# Patient Record
Sex: FEMALE | Race: WHITE | HISPANIC OR LATINO | Employment: FULL TIME | URBAN - METROPOLITAN AREA
[De-identification: names, ages, dates, MRNs, and addresses within clinical notes are randomized per-mention and may not be internally consistent; named-entity substitution may affect disease eponyms.]

---

## 2021-11-17 ENCOUNTER — APPOINTMENT (OUTPATIENT)
Dept: RADIOLOGY | Facility: MEDICAL CENTER | Age: 46
End: 2021-11-17
Attending: EMERGENCY MEDICINE
Payer: COMMERCIAL

## 2021-11-17 ENCOUNTER — HOSPITAL ENCOUNTER (OUTPATIENT)
Facility: MEDICAL CENTER | Age: 46
End: 2021-11-18
Attending: EMERGENCY MEDICINE | Admitting: STUDENT IN AN ORGANIZED HEALTH CARE EDUCATION/TRAINING PROGRAM
Payer: COMMERCIAL

## 2021-11-17 ENCOUNTER — OFFICE VISIT (OUTPATIENT)
Dept: URGENT CARE | Facility: CLINIC | Age: 46
End: 2021-11-17
Payer: COMMERCIAL

## 2021-11-17 VITALS
HEIGHT: 64 IN | RESPIRATION RATE: 16 BRPM | BODY MASS INDEX: 25.37 KG/M2 | DIASTOLIC BLOOD PRESSURE: 92 MMHG | OXYGEN SATURATION: 98 % | TEMPERATURE: 98.8 F | WEIGHT: 148.59 LBS | HEART RATE: 83 BPM | SYSTOLIC BLOOD PRESSURE: 126 MMHG

## 2021-11-17 DIAGNOSIS — R42 DIZZY SPELLS: ICD-10-CM

## 2021-11-17 DIAGNOSIS — R94.31 ABNORMAL EKG: ICD-10-CM

## 2021-11-17 DIAGNOSIS — K21.9 GASTROESOPHAGEAL REFLUX DISEASE WITHOUT ESOPHAGITIS: ICD-10-CM

## 2021-11-17 DIAGNOSIS — R42 LIGHT HEADED: ICD-10-CM

## 2021-11-17 DIAGNOSIS — R07.9 CHEST PAIN, UNSPECIFIED TYPE: ICD-10-CM

## 2021-11-17 DIAGNOSIS — R53.83 OTHER FATIGUE: ICD-10-CM

## 2021-11-17 DIAGNOSIS — M54.9 UPPER BACK PAIN: ICD-10-CM

## 2021-11-17 PROBLEM — R79.89 ELEVATED LFTS: Status: ACTIVE | Noted: 2020-12-03

## 2021-11-17 PROBLEM — M48.02 CERVICAL STENOSIS OF SPINE: Status: ACTIVE | Noted: 2021-11-17

## 2021-11-17 LAB
ALBUMIN SERPL BCP-MCNC: 4.6 G/DL (ref 3.2–4.9)
ALBUMIN/GLOB SERPL: 2 G/DL
ALP SERPL-CCNC: 53 U/L (ref 30–99)
ALT SERPL-CCNC: 14 U/L (ref 2–50)
ANION GAP SERPL CALC-SCNC: 10 MMOL/L (ref 7–16)
AST SERPL-CCNC: 21 U/L (ref 12–45)
BASOPHILS # BLD AUTO: 0.7 % (ref 0–1.8)
BASOPHILS # BLD: 0.06 K/UL (ref 0–0.12)
BILIRUB SERPL-MCNC: 0.6 MG/DL (ref 0.1–1.5)
BUN SERPL-MCNC: 10 MG/DL (ref 8–22)
CALCIUM SERPL-MCNC: 9 MG/DL (ref 8.5–10.5)
CHLORIDE SERPL-SCNC: 106 MMOL/L (ref 96–112)
CO2 SERPL-SCNC: 23 MMOL/L (ref 20–33)
CREAT SERPL-MCNC: 0.78 MG/DL (ref 0.5–1.4)
EKG IMPRESSION: NORMAL
EKG IMPRESSION: NORMAL
EOSINOPHIL # BLD AUTO: 0.21 K/UL (ref 0–0.51)
EOSINOPHIL NFR BLD: 2.6 % (ref 0–6.9)
ERYTHROCYTE [DISTWIDTH] IN BLOOD BY AUTOMATED COUNT: 50.6 FL (ref 35.9–50)
GLOBULIN SER CALC-MCNC: 2.3 G/DL (ref 1.9–3.5)
GLUCOSE SERPL-MCNC: 87 MG/DL (ref 65–99)
HCT VFR BLD AUTO: 43.3 % (ref 37–47)
HGB BLD-MCNC: 14.2 G/DL (ref 12–16)
IMM GRANULOCYTES # BLD AUTO: 0.02 K/UL (ref 0–0.11)
IMM GRANULOCYTES NFR BLD AUTO: 0.2 % (ref 0–0.9)
LYMPHOCYTES # BLD AUTO: 1.81 K/UL (ref 1–4.8)
LYMPHOCYTES NFR BLD: 22.5 % (ref 22–41)
MCH RBC QN AUTO: 29 PG (ref 27–33)
MCHC RBC AUTO-ENTMCNC: 32.8 G/DL (ref 33.6–35)
MCV RBC AUTO: 88.4 FL (ref 81.4–97.8)
MONOCYTES # BLD AUTO: 0.72 K/UL (ref 0–0.85)
MONOCYTES NFR BLD AUTO: 8.9 % (ref 0–13.4)
NEUTROPHILS # BLD AUTO: 5.24 K/UL (ref 2–7.15)
NEUTROPHILS NFR BLD: 65.1 % (ref 44–72)
NRBC # BLD AUTO: 0 K/UL
NRBC BLD-RTO: 0 /100 WBC
PLATELET # BLD AUTO: 218 K/UL (ref 164–446)
PMV BLD AUTO: 11.8 FL (ref 9–12.9)
POTASSIUM SERPL-SCNC: 3.9 MMOL/L (ref 3.6–5.5)
PROT SERPL-MCNC: 6.9 G/DL (ref 6–8.2)
RBC # BLD AUTO: 4.9 M/UL (ref 4.2–5.4)
SODIUM SERPL-SCNC: 139 MMOL/L (ref 135–145)
TROPONIN T SERPL-MCNC: <6 NG/L (ref 6–19)
WBC # BLD AUTO: 8.1 K/UL (ref 4.8–10.8)

## 2021-11-17 PROCEDURE — A9270 NON-COVERED ITEM OR SERVICE: HCPCS | Performed by: EMERGENCY MEDICINE

## 2021-11-17 PROCEDURE — 85025 COMPLETE CBC W/AUTO DIFF WBC: CPT

## 2021-11-17 PROCEDURE — 93005 ELECTROCARDIOGRAM TRACING: CPT

## 2021-11-17 PROCEDURE — 93005 ELECTROCARDIOGRAM TRACING: CPT | Performed by: EMERGENCY MEDICINE

## 2021-11-17 PROCEDURE — 71045 X-RAY EXAM CHEST 1 VIEW: CPT

## 2021-11-17 PROCEDURE — 36415 COLL VENOUS BLD VENIPUNCTURE: CPT

## 2021-11-17 PROCEDURE — G0378 HOSPITAL OBSERVATION PER HR: HCPCS

## 2021-11-17 PROCEDURE — 93000 ELECTROCARDIOGRAM COMPLETE: CPT | Performed by: NURSE PRACTITIONER

## 2021-11-17 PROCEDURE — 700111 HCHG RX REV CODE 636 W/ 250 OVERRIDE (IP): Performed by: STUDENT IN AN ORGANIZED HEALTH CARE EDUCATION/TRAINING PROGRAM

## 2021-11-17 PROCEDURE — 93005 ELECTROCARDIOGRAM TRACING: CPT | Performed by: STUDENT IN AN ORGANIZED HEALTH CARE EDUCATION/TRAINING PROGRAM

## 2021-11-17 PROCEDURE — 99285 EMERGENCY DEPT VISIT HI MDM: CPT

## 2021-11-17 PROCEDURE — 99219 PR INITIAL OBSERVATION CARE,LEVL II: CPT | Performed by: STUDENT IN AN ORGANIZED HEALTH CARE EDUCATION/TRAINING PROGRAM

## 2021-11-17 PROCEDURE — 84484 ASSAY OF TROPONIN QUANT: CPT

## 2021-11-17 PROCEDURE — 700102 HCHG RX REV CODE 250 W/ 637 OVERRIDE(OP): Performed by: EMERGENCY MEDICINE

## 2021-11-17 PROCEDURE — 96372 THER/PROPH/DIAG INJ SC/IM: CPT

## 2021-11-17 PROCEDURE — 700102 HCHG RX REV CODE 250 W/ 637 OVERRIDE(OP): Performed by: STUDENT IN AN ORGANIZED HEALTH CARE EDUCATION/TRAINING PROGRAM

## 2021-11-17 PROCEDURE — A9270 NON-COVERED ITEM OR SERVICE: HCPCS | Performed by: STUDENT IN AN ORGANIZED HEALTH CARE EDUCATION/TRAINING PROGRAM

## 2021-11-17 PROCEDURE — 80053 COMPREHEN METABOLIC PANEL: CPT

## 2021-11-17 PROCEDURE — 99204 OFFICE O/P NEW MOD 45 MIN: CPT | Performed by: NURSE PRACTITIONER

## 2021-11-17 RX ORDER — ENALAPRILAT 1.25 MG/ML
1.25 INJECTION INTRAVENOUS EVERY 6 HOURS PRN
Status: DISCONTINUED | OUTPATIENT
Start: 2021-11-17 | End: 2021-11-18 | Stop reason: HOSPADM

## 2021-11-17 RX ORDER — BISACODYL 10 MG
10 SUPPOSITORY, RECTAL RECTAL
Status: DISCONTINUED | OUTPATIENT
Start: 2021-11-17 | End: 2021-11-18 | Stop reason: HOSPADM

## 2021-11-17 RX ORDER — ONDANSETRON 4 MG/1
4 TABLET, ORALLY DISINTEGRATING ORAL EVERY 4 HOURS PRN
Status: DISCONTINUED | OUTPATIENT
Start: 2021-11-17 | End: 2021-11-18 | Stop reason: HOSPADM

## 2021-11-17 RX ORDER — CLONIDINE HYDROCHLORIDE 0.1 MG/1
0.1 TABLET ORAL EVERY 6 HOURS PRN
Status: DISCONTINUED | OUTPATIENT
Start: 2021-11-17 | End: 2021-11-18 | Stop reason: HOSPADM

## 2021-11-17 RX ORDER — ONDANSETRON 2 MG/ML
4 INJECTION INTRAMUSCULAR; INTRAVENOUS EVERY 4 HOURS PRN
Status: DISCONTINUED | OUTPATIENT
Start: 2021-11-17 | End: 2021-11-18 | Stop reason: HOSPADM

## 2021-11-17 RX ORDER — ASPIRIN 300 MG/1
300 SUPPOSITORY RECTAL DAILY
Status: CANCELLED | OUTPATIENT
Start: 2021-11-17

## 2021-11-17 RX ORDER — LABETALOL HYDROCHLORIDE 5 MG/ML
10 INJECTION, SOLUTION INTRAVENOUS EVERY 4 HOURS PRN
Status: DISCONTINUED | OUTPATIENT
Start: 2021-11-17 | End: 2021-11-18 | Stop reason: HOSPADM

## 2021-11-17 RX ORDER — OMEPRAZOLE 20 MG/1
20 CAPSULE, DELAYED RELEASE ORAL 2 TIMES DAILY
Status: DISCONTINUED | OUTPATIENT
Start: 2021-11-17 | End: 2021-11-18 | Stop reason: HOSPADM

## 2021-11-17 RX ORDER — POLYETHYLENE GLYCOL 3350 17 G/17G
1 POWDER, FOR SOLUTION ORAL
Status: DISCONTINUED | OUTPATIENT
Start: 2021-11-17 | End: 2021-11-18 | Stop reason: HOSPADM

## 2021-11-17 RX ORDER — ASPIRIN 81 MG/1
324 TABLET, CHEWABLE ORAL DAILY
Status: CANCELLED | OUTPATIENT
Start: 2021-11-17

## 2021-11-17 RX ORDER — ACETAMINOPHEN 325 MG/1
650 TABLET ORAL EVERY 6 HOURS PRN
Status: DISCONTINUED | OUTPATIENT
Start: 2021-11-17 | End: 2021-11-18 | Stop reason: HOSPADM

## 2021-11-17 RX ORDER — ASPIRIN 325 MG
325 TABLET ORAL DAILY
Status: CANCELLED | OUTPATIENT
Start: 2021-11-17

## 2021-11-17 RX ADMIN — ASPIRIN 325 MG: 325 TABLET, COATED ORAL at 13:48

## 2021-11-17 RX ADMIN — ENOXAPARIN SODIUM 40 MG: 40 INJECTION SUBCUTANEOUS at 17:26

## 2021-11-17 RX ADMIN — OMEPRAZOLE 20 MG: 20 CAPSULE, DELAYED RELEASE ORAL at 17:26

## 2021-11-17 ASSESSMENT — ENCOUNTER SYMPTOMS
SPUTUM PRODUCTION: 0
SHORTNESS OF BREATH: 0
NAUSEA: 0
CONSTITUTIONAL NEGATIVE: 1
ABDOMINAL PAIN: 1
EYES NEGATIVE: 1
MUSCULOSKELETAL NEGATIVE: 1
VOMITING: 0
RESPIRATORY NEGATIVE: 1
HEARTBURN: 1
WEAKNESS: 0
CHILLS: 0
NEUROLOGICAL NEGATIVE: 1
ABDOMINAL PAIN: 1
PSYCHIATRIC NEGATIVE: 1
COUGH: 1
HEARTBURN: 1
FEVER: 0

## 2021-11-17 ASSESSMENT — HEART SCORE
HISTORY: MODERATELY SUSPICIOUS
TROPONIN: LESS THAN OR EQUAL TO NORMAL LIMIT
AGE: 45-64
HEART SCORE: 4
ECG: SIGNIFICANT ST-DEPRESSION
RISK FACTORS: NO KNOWN RISK FACTORS

## 2021-11-17 ASSESSMENT — PAIN DESCRIPTION - PAIN TYPE: TYPE: ACUTE PAIN

## 2021-11-17 NOTE — PROGRESS NOTES
"Subjective:   Avani Young is a 46 y.o. female who presents for Gastrophageal Reflux (x4days )       Gastrophageal Reflux  She complains of abdominal pain, chest pain and heartburn.     Pt presents for evaluation of a new problem, reports 4-day history of epigastric pain.  Patient present history of having symptoms such as these due to episodes of not eating.  Patient also reports right upper back pain, intermittent lightheadedness and dizziness, fatigue, and overall not feeling well.  She reports feeling significantly worse today than she has in the past few days.  She called a  telehealth nurse line, who recommended she be seen to rule out possible MI.  Patient does not have a history of cardiac disease nor family history of early cardiac disease.  There is cardiac disease on her paternal side, however patient thinks that this occurs with advanced age.  Patient has tried changing her diet as well as over-the-counter Tums with mild relief.  Patient states that she has had increased stress lately as well as travels long miles frequently for work purposes.  She denies lower extremity swelling, and states that she overall is an active person.    Review of Systems   Constitutional: Negative.    HENT: Negative.    Eyes: Negative.    Respiratory: Negative.    Cardiovascular: Positive for chest pain.   Gastrointestinal: Positive for abdominal pain and heartburn.   Genitourinary: Negative.    Musculoskeletal: Negative.    Skin: Negative.    Neurological: Negative.    Psychiatric/Behavioral: Negative.    All other systems reviewed and are negative.      MEDS: No current outpatient medications on file.  ALLERGIES:   Allergies   Allergen Reactions   • Compazine    • Penicillins        Patient's PMH, SocHx, SurgHx, FamHx, Drug allergies and medications were reviewed.     Objective:   /92   Pulse 83   Temp 37.1 °C (98.8 °F) (Temporal)   Resp 16   Ht 1.626 m (5' 4\")   Wt 67.4 kg (148 lb 9.4 oz)   SpO2 98%   " BMI 25.51 kg/m²     Physical Exam  Vitals and nursing note reviewed.   Constitutional:       General: She is awake.      Appearance: Normal appearance. She is well-developed and normal weight.   HENT:      Head: Normocephalic and atraumatic.      Right Ear: Tympanic membrane, ear canal and external ear normal.      Left Ear: Tympanic membrane, ear canal and external ear normal.      Nose: Nose normal.      Mouth/Throat:      Lips: Pink.      Mouth: Mucous membranes are moist.      Pharynx: Oropharynx is clear. Uvula midline.   Eyes:      Extraocular Movements: Extraocular movements intact.      Conjunctiva/sclera: Conjunctivae normal.      Pupils: Pupils are equal, round, and reactive to light.   Neck:      Thyroid: No thyromegaly.      Trachea: Trachea normal.   Cardiovascular:      Rate and Rhythm: Normal rate and regular rhythm.      Pulses: Normal pulses.      Heart sounds: Normal heart sounds, S1 normal and S2 normal.   Pulmonary:      Effort: Pulmonary effort is normal. No respiratory distress.      Breath sounds: Normal breath sounds. No wheezing, rhonchi or rales.   Abdominal:      General: Bowel sounds are normal.      Palpations: Abdomen is soft.   Musculoskeletal:         General: Normal range of motion.      Cervical back: Full passive range of motion without pain, normal range of motion and neck supple.   Lymphadenopathy:      Cervical: No cervical adenopathy.   Skin:     General: Skin is warm and dry.      Capillary Refill: Capillary refill takes less than 2 seconds.   Neurological:      General: No focal deficit present.      Mental Status: She is alert and oriented to person, place, and time.      Gait: Gait is intact.   Psychiatric:         Attention and Perception: Attention and perception normal.         Mood and Affect: Mood normal.         Speech: Speech normal.         Behavior: Behavior normal. Behavior is cooperative.         Thought Content: Thought content normal.         Judgment: Judgment  normal.       EKG- NSR w/o acute changes    Assessment/Plan:   Assessment      1. Chest pain, unspecified type  - EKG - Clinic Performed    2. Upper back pain  - EKG - Clinic Performed    3. Gastroesophageal reflux disease without esophagitis  - EKG - Clinic Performed    4. Light headed  - EKG - Clinic Performed    5. Dizzy spells  - EKG - Clinic Performed    6. Other fatigue  - EKG - Clinic Performed    Vital signs stable at today's acute urgent care visit.  Reviewed test results that were completed in the clinic.  Discussed possible differentials with patient at length to include ulcer, myocardial infarction, PE, viral illness.  Discussed with patient that she has several different components that may indicate serious pathology of which a more thorough evaluation is warranted.  Due to this, patient recommend to go to ED for further evaluation and higher level of care.  All questions were encouraged and answered to the patient's satisfaction and understanding, and they agree to the plan of care.     I personally reviewed prior external notes and test results pertinent to today's visit.  I have independently reviewed and interpreted all diagnostics ordered during this urgent care acute visit. Time spent evaluating this patient was a minimum of 30 minutes and includes preparing for visit, counseling/education, exam, evaluation, obtaining history, and ordering lab/test/procedures.      Please note that this dictation was created using voice recognition software. I have made a reasonable attempt to correct obvious errors, but I expect that there are errors of grammar and possibly content that I did not discover before finalizing the note.

## 2021-11-17 NOTE — ED PROVIDER NOTES
"ED Provider Note    CHIEF COMPLAINT  Chief Complaint   Patient presents with   • Sent from Urgent Care     Seen there for fatigue, indigestion, and dizziness, but had pain in her R upper back today, pt also had chest pain while waiting at urgent care   • Chest Pain     Today while at urgent care, described as tightness, intermittent, substernal radiating to epigastric, denies cardiac hx. Pt states some relief with eating.        HPI  Avani Young is a 46 y.o. female who presents with intermittent chest discomfort and epigastric discomfort since last Thursday.  She states it feels like \"indigestion\".  She recounts an episode of anterior chest pressure 2 hours after eating.  She states her father had heart disease although is unsure at what age.  She does not smoke.  Patient complaining of fatigue, feeling lightheaded, no diaphoresis or nausea, no cough.  He felt short of breath this morning which was new for her.  No pleuritic component to the pain.  She denies history of DVT or pulmonary embolism.    REVIEW OF SYSTEMS    Constitutional: Fatigue  Respiratory: Short of breath today  Cardiac: Chest pressure  Gastrointestinal: No abdominal pain.  States \"it might be indigestion\"  Musculoskeletal: No back pain  Neurologic: No headache  Skin: No swelling       All other systems are negative.       PAST MEDICAL HISTORY  History reviewed. No pertinent past medical history.    FAMILY HISTORY  No family history on file.    SOCIAL HISTORY  Social History     Socioeconomic History   • Marital status:      Spouse name: Not on file   • Number of children: Not on file   • Years of education: Not on file   • Highest education level: Not on file   Occupational History   • Not on file   Tobacco Use   • Smoking status: Never Smoker   • Smokeless tobacco: Never Used   Substance and Sexual Activity   • Alcohol use: Not Currently   • Drug use: Not Currently   • Sexual activity: Not on file   Other Topics Concern   • Not on " "file   Social History Narrative   • Not on file     Social Determinants of Health     Financial Resource Strain:    • Difficulty of Paying Living Expenses: Not on file   Food Insecurity:    • Worried About Running Out of Food in the Last Year: Not on file   • Ran Out of Food in the Last Year: Not on file   Transportation Needs:    • Lack of Transportation (Medical): Not on file   • Lack of Transportation (Non-Medical): Not on file   Physical Activity:    • Days of Exercise per Week: Not on file   • Minutes of Exercise per Session: Not on file   Stress:    • Feeling of Stress : Not on file   Social Connections:    • Frequency of Communication with Friends and Family: Not on file   • Frequency of Social Gatherings with Friends and Family: Not on file   • Attends Worship Services: Not on file   • Active Member of Clubs or Organizations: Not on file   • Attends Club or Organization Meetings: Not on file   • Marital Status: Not on file   Intimate Partner Violence:    • Fear of Current or Ex-Partner: Not on file   • Emotionally Abused: Not on file   • Physically Abused: Not on file   • Sexually Abused: Not on file   Housing Stability:    • Unable to Pay for Housing in the Last Year: Not on file   • Number of Places Lived in the Last Year: Not on file   • Unstable Housing in the Last Year: Not on file       SURGICAL HISTORY  History reviewed. No pertinent surgical history.    CURRENT MEDICATIONS  Home Medications     Reviewed by Faizan Walton R.N. (Registered Nurse) on 11/17/21 at 1114  Med List Status: <None>   Medication Last Dose Status        Patient Kyle Taking any Medications                       ALLERGIES  Allergies   Allergen Reactions   • Compazine    • Penicillins        PHYSICAL EXAM  VITAL SIGNS: /92   Pulse 81   Temp 36.4 °C (97.6 °F) (Temporal)   Resp 16   Ht 1.626 m (5' 4\")   Wt 67.3 kg (148 lb 5.9 oz)   SpO2 97%   BMI 25.47 kg/m²   Constitutional:  Non-toxic appearance.   HENT: No facial " swelling  Eyes: Anicteric, no conjunctivitis.     Cardiovascular: Normal heart rate, Normal rhythm, no murmur  Pulmonary:  No wheezing, No rales.  Good bilateral air movement  Gastrointestinal: Soft, No tenderness, No masses  Skin: Warm, Dry, No cyanosis.  No peripheral edema  Neurologic: Speech is clear, follows commands, facial expression is symmetrical.  Psychiatric: Affect normal,  Mood normal.  Patient is calm and cooperative  Musculoskeletal: No chest wall tenderness    EKG/Labs  Results for orders placed or performed during the hospital encounter of 11/17/21   CBC with Differential   Result Value Ref Range    WBC 8.1 4.8 - 10.8 K/uL    RBC 4.90 4.20 - 5.40 M/uL    Hemoglobin 14.2 12.0 - 16.0 g/dL    Hematocrit 43.3 37.0 - 47.0 %    MCV 88.4 81.4 - 97.8 fL    MCH 29.0 27.0 - 33.0 pg    MCHC 32.8 (L) 33.6 - 35.0 g/dL    RDW 50.6 (H) 35.9 - 50.0 fL    Platelet Count 218 164 - 446 K/uL    MPV 11.8 9.0 - 12.9 fL    Neutrophils-Polys 65.10 44.00 - 72.00 %    Lymphocytes 22.50 22.00 - 41.00 %    Monocytes 8.90 0.00 - 13.40 %    Eosinophils 2.60 0.00 - 6.90 %    Basophils 0.70 0.00 - 1.80 %    Immature Granulocytes 0.20 0.00 - 0.90 %    Nucleated RBC 0.00 /100 WBC    Neutrophils (Absolute) 5.24 2.00 - 7.15 K/uL    Lymphs (Absolute) 1.81 1.00 - 4.80 K/uL    Monos (Absolute) 0.72 0.00 - 0.85 K/uL    Eos (Absolute) 0.21 0.00 - 0.51 K/uL    Baso (Absolute) 0.06 0.00 - 0.12 K/uL    Immature Granulocytes (abs) 0.02 0.00 - 0.11 K/uL    NRBC (Absolute) 0.00 K/uL   Comp Metabolic Panel   Result Value Ref Range    Sodium 139 135 - 145 mmol/L    Potassium 3.9 3.6 - 5.5 mmol/L    Chloride 106 96 - 112 mmol/L    Co2 23 20 - 33 mmol/L    Anion Gap 10.0 7.0 - 16.0    Glucose 87 65 - 99 mg/dL    Bun 10 8 - 22 mg/dL    Creatinine 0.78 0.50 - 1.40 mg/dL    Calcium 9.0 8.5 - 10.5 mg/dL    AST(SGOT) 21 12 - 45 U/L    ALT(SGPT) 14 2 - 50 U/L    Alkaline Phosphatase 53 30 - 99 U/L    Total Bilirubin 0.6 0.1 - 1.5 mg/dL    Albumin 4.6 3.2  - 4.9 g/dL    Total Protein 6.9 6.0 - 8.2 g/dL    Globulin 2.3 1.9 - 3.5 g/dL    A-G Ratio 2.0 g/dL   TROPONIN   Result Value Ref Range    Troponin T <6 6 - 19 ng/L   ESTIMATED GFR   Result Value Ref Range    GFR If African American >60 >60 mL/min/1.73 m 2    GFR If Non African American >60 >60 mL/min/1.73 m 2   EKG   Result Value Ref Range    Report       [facility] Emergency Dept.    Test Date:  2021  Pt Name:    PHOEBE GAYTAN             Department: ER  MRN:        [id number]                      Room:  Gender:     Female                       Technician: [id number]  :        1975                   Requested By:ER TRIAGE PROTOCOL  Order #:    [id number]                    Reading MD: DEVORAH STALLINGS MD    Measurements  Intervals                                Axis  Rate:       77                           P:          45  AK:         108                          QRS:        81  QRSD:       112                          T:          6  QT:         362  QTc:        410    Interpretive Statements  Sinus rhythm  Short AK interval  Borderline intraventricular conduction delay  Minimal ST depression, inferior leads, V5, V6  No previous ECG available for comparison  Electronically Signed On 2021 11:23:20 PST by DEVORAH STALLINGS MD           RADIOLOGY/PROCEDURES  DX-CHEST-PORTABLE (1 VIEW)   Final Result      No acute cardiac or pulmonary abnormality is noted.            COURSE & MEDICAL DECISION MAKING  Pertinent Labs & Imaging studies reviewed. (See chart for details)  Patient is negative for PE by PERC rule.  Differential diagnosis includes cardiac ischemia, esophagitis or reflux, chest wall pain, pulmonary pathology.  Patient has negative troponin.  Patient has abnormal EKG, we are unable to see her EKG from earlier today at urgent care, there is no old EKG prior to this according to the patient.  I discussed the case with Dr. Strong on-call for cardiology, who felt it reasonable  to hospitalize the patient given EKG abnormalities and new episodes of chest pain.  Plan for hospitalization, ongoing evaluation and treatment under the care of the hospitalist service.  Patient received 325 mg of aspirin in the emergency department.  She is currently chest pain-free    FINAL IMPRESSION     1. Chest pain, unspecified type     2. Abnormal EKG                     Electronically signed by: Harry Kline M.D., 11/17/2021 11:23 AM

## 2021-11-17 NOTE — ED NOTES
Pharmacy Medication Reconciliation      ~Medication reconciliation updated and complete per pt at bedside  ~Allergies have been verified and updated   ~No oral ABX within the last 30 days  ~Patient home pharmacy:[facility]

## 2021-11-17 NOTE — ASSESSMENT & PLAN NOTE
Presenting with chest pain from urgent care. EKG with minimal ST depressions in V3-V5. ED consulted cardio who recommended work up for chest pain  Denies shortness of breath, leg swelling, palpitations. Takes progesterone at home.  Telemetry  Trend trops  ASA given in ED  Omeprazole for acid reflux

## 2021-11-17 NOTE — ED TRIAGE NOTES
"Chief Complaint   Patient presents with   • Sent from Urgent Care     Seen there for fatigue, indigestion, and dizziness, but had pain in her R upper back today, pt also had chest pain while waiting at urgent care   • Chest Pain     Today while at urgent care, described as tightness, intermittent, substernal radiating to epigastric, denies cardiac hx. Pt states some relief with eating.      Pt ambulatory to triage for above complaints, VSS on RA, GCS 15, NAD.    EKG completed at triage and pt roomed.    Denies all s/sx of covid, denies recent travel, denies fevers.    /92   Pulse 81   Temp 36.4 °C (97.6 °F) (Temporal)   Resp 16   Ht 1.626 m (5' 4\")   Wt 67.3 kg (148 lb 5.9 oz)   SpO2 97%   BMI 25.47 kg/m²      "

## 2021-11-17 NOTE — H&P
Hospital Medicine History & Physical Note    Date of Service  11/17/2021    Primary Care Physician  Pcp Pt States None    Consultants  cardiology    Specialist Names: Ligia    Code Status  Full Code    Chief Complaint  Chief Complaint   Patient presents with   • Sent from Urgent Care     Seen there for fatigue, indigestion, and dizziness, but had pain in her R upper back today, pt also had chest pain while waiting at urgent care   • Chest Pain     Today while at urgent care, described as tightness, intermittent, substernal radiating to epigastric, denies cardiac hx. Pt states some relief with eating.        History of Presenting Illness  Avani Young is a 46 y.o. female who presented 11/17/2021 with a history of acid reflux who present with chest pain. Pain started last Thursday and is intermittent, located in the mid upper chest. She had an additional episode this morning lasting 2 hours after eating. She has never had symptoms like this in the past. She has a history of acid reflux which usually presents differently and responds to tums, which it did not this time. She recently was traveling, but denies palpitations, or leg swelling. She reports that she does 20 mins of cardio daily and never has chest pain or shortness of breath.    I discussed the plan of care with patient.    Review of Systems  Review of Systems   Constitutional: Negative for chills and fever.   Respiratory: Positive for cough. Negative for sputum production and shortness of breath.    Cardiovascular: Positive for chest pain. Negative for leg swelling.   Gastrointestinal: Positive for abdominal pain (midepigastric) and heartburn. Negative for nausea and vomiting.   Neurological: Negative for weakness.   All other systems reviewed and are negative.      Past Medical History  Acid reflux c-spine stenosis    Surgical History   tubal ligation     Family History  Father had heart disease with heart surgery(?CABG) in his 60s, mother had lung  cancer and COPD  Family history reviewed with patient. There is family history that is pertinent to the chief complaint.     Social History   reports that she has never smoked. She has never used smokeless tobacco. She reports previous alcohol use. She reports previous drug use.    Allergies  Allergies   Allergen Reactions   • Prochlorperazine Unspecified     Jaw locked up     • Penicillins Unspecified     Childhood         Medications  Prior to Admission Medications   Prescriptions Last Dose Informant Patient Reported? Taking?   Misc Natural Products (PROGESTERONE EX) 11/17/2021 at 0715 Patient Yes Yes   Sig: Apply 1 Application topically 2 times a day.   NON SPECIFIED 11/16/2021 at AM Patient Yes Yes   Sig: Take 2 Capsules by mouth 1 time a day as needed (upset stomach). Tummy Tune Up   Probiotic Product (PROBIOTIC PO) 11/15/2021 at AM Patient Yes Yes   Sig: Take 1 Capsule by mouth 1 time a day as needed (Upset Stomach).   Simethicone (PHAZYME PO) 11/15/2021 at AM Patient Yes Yes   Sig: Take 2 Tablets by mouth 1 time a day as needed (Gas).      Facility-Administered Medications: None       Physical Exam  Temp:  [36.4 °C (97.6 °F)-37.1 °C (98.8 °F)] 36.4 °C (97.6 °F)  Pulse:  [78-89] 89  Resp:  [15-16] 15  BP: (126-165)/(77-97) 140/77  SpO2:  [97 %-98 %] 98 %  Blood Pressure: 140/77   Temperature: 36.4 °C (97.6 °F)   Pulse: 89   Respiration: 15   Pulse Oximetry: 98 %       Physical Exam  Vitals and nursing note reviewed.   Constitutional:       General: She is not in acute distress.     Appearance: Normal appearance. She is not ill-appearing.   HENT:      Head: Normocephalic and atraumatic.   Eyes:      General: No scleral icterus.        Right eye: No discharge.         Left eye: No discharge.   Cardiovascular:      Rate and Rhythm: Normal rate and regular rhythm.      Heart sounds: Normal heart sounds. No murmur heard.      Pulmonary:      Effort: Pulmonary effort is normal. No respiratory distress.      Breath  sounds: Normal breath sounds. No wheezing.   Chest:      Chest wall: Tenderness (sternal) present.   Abdominal:      General: Abdomen is flat. There is no distension.      Palpations: Abdomen is soft.      Tenderness: There is no abdominal tenderness.   Musculoskeletal:         General: No tenderness.      Right lower leg: No edema.      Left lower leg: No edema.   Skin:     General: Skin is warm and dry.      Coloration: Skin is not jaundiced.   Neurological:      Mental Status: She is alert and oriented to person, place, and time.      Cranial Nerves: No cranial nerve deficit.   Psychiatric:         Mood and Affect: Mood normal.         Behavior: Behavior normal.         Judgment: Judgment normal.         Laboratory:  Recent Labs     11/17/21  1130   WBC 8.1   RBC 4.90   HEMOGLOBIN 14.2   HEMATOCRIT 43.3   MCV 88.4   MCH 29.0   MCHC 32.8*   RDW 50.6*   PLATELETCT 218   MPV 11.8     Recent Labs     11/17/21  1235   SODIUM 139   POTASSIUM 3.9   CHLORIDE 106   CO2 23   GLUCOSE 87   BUN 10   CREATININE 0.78   CALCIUM 9.0     Recent Labs     11/17/21  1235   ALTSGPT 14   ASTSGOT 21   ALKPHOSPHAT 53   TBILIRUBIN 0.6   GLUCOSE 87         No results for input(s): NTPROBNP in the last 72 hours.      Recent Labs     11/17/21  1235   TROPONINT <6       Imaging:  DX-CHEST-PORTABLE (1 VIEW)   Final Result      No acute cardiac or pulmonary abnormality is noted.          EKG:  I have personally reviewed the images and compared with prior images. and My impression is: sinus rhythm with minimal ST depression, rate 77    Assessment/Plan:  I anticipate this patient is appropriate for observation status at this time.    * Chest pain  Assessment & Plan  Presenting with chest pain from urgent care. EKG with minimal ST depressions in V3-V5. ED consulted cardio who recommended work up for chest pain  Denies shortness of breath, leg swelling, palpitations. Takes progesterone at home.  Telemetry  Trend trops  ASA given in ED  Omeprazole  for acid reflux    Cervical stenosis of spine  Assessment & Plan  Chronic. Prn pain meds    Acid reflux  Assessment & Plan  omperazole BID      VTE prophylaxis: enoxaparin ppx

## 2021-11-18 VITALS
HEIGHT: 64 IN | TEMPERATURE: 97.7 F | OXYGEN SATURATION: 97 % | DIASTOLIC BLOOD PRESSURE: 68 MMHG | BODY MASS INDEX: 25.33 KG/M2 | SYSTOLIC BLOOD PRESSURE: 113 MMHG | HEART RATE: 80 BPM | WEIGHT: 148.37 LBS | RESPIRATION RATE: 16 BRPM

## 2021-11-18 PROBLEM — R07.9 CHEST PAIN: Status: RESOLVED | Noted: 2021-11-17 | Resolved: 2021-11-18

## 2021-11-18 LAB
ANION GAP SERPL CALC-SCNC: 11 MMOL/L (ref 7–16)
BASOPHILS # BLD AUTO: 1 % (ref 0–1.8)
BASOPHILS # BLD: 0.07 K/UL (ref 0–0.12)
BUN SERPL-MCNC: 15 MG/DL (ref 8–22)
CALCIUM SERPL-MCNC: 9.1 MG/DL (ref 8.5–10.5)
CHLORIDE SERPL-SCNC: 106 MMOL/L (ref 96–112)
CHOLEST SERPL-MCNC: 163 MG/DL (ref 100–199)
CO2 SERPL-SCNC: 22 MMOL/L (ref 20–33)
CREAT SERPL-MCNC: 0.93 MG/DL (ref 0.5–1.4)
EKG IMPRESSION: NORMAL
EOSINOPHIL # BLD AUTO: 0.36 K/UL (ref 0–0.51)
EOSINOPHIL NFR BLD: 5 % (ref 0–6.9)
ERYTHROCYTE [DISTWIDTH] IN BLOOD BY AUTOMATED COUNT: 50.4 FL (ref 35.9–50)
EST. AVERAGE GLUCOSE BLD GHB EST-MCNC: 100 MG/DL
GLUCOSE SERPL-MCNC: 97 MG/DL (ref 65–99)
HBA1C MFR BLD: 5.1 % (ref 4–5.6)
HCT VFR BLD AUTO: 42.1 % (ref 37–47)
HDLC SERPL-MCNC: 62 MG/DL
HGB BLD-MCNC: 13.9 G/DL (ref 12–16)
IMM GRANULOCYTES # BLD AUTO: 0.03 K/UL (ref 0–0.11)
IMM GRANULOCYTES NFR BLD AUTO: 0.4 % (ref 0–0.9)
LDLC SERPL CALC-MCNC: 75 MG/DL
LYMPHOCYTES # BLD AUTO: 2.49 K/UL (ref 1–4.8)
LYMPHOCYTES NFR BLD: 34.3 % (ref 22–41)
MCH RBC QN AUTO: 28.9 PG (ref 27–33)
MCHC RBC AUTO-ENTMCNC: 33 G/DL (ref 33.6–35)
MCV RBC AUTO: 87.5 FL (ref 81.4–97.8)
MONOCYTES # BLD AUTO: 0.7 K/UL (ref 0–0.85)
MONOCYTES NFR BLD AUTO: 9.6 % (ref 0–13.4)
NEUTROPHILS # BLD AUTO: 3.61 K/UL (ref 2–7.15)
NEUTROPHILS NFR BLD: 49.7 % (ref 44–72)
NRBC # BLD AUTO: 0 K/UL
NRBC BLD-RTO: 0 /100 WBC
PLATELET # BLD AUTO: 227 K/UL (ref 164–446)
PMV BLD AUTO: 11.4 FL (ref 9–12.9)
POTASSIUM SERPL-SCNC: 4.4 MMOL/L (ref 3.6–5.5)
RBC # BLD AUTO: 4.81 M/UL (ref 4.2–5.4)
SODIUM SERPL-SCNC: 139 MMOL/L (ref 135–145)
TRIGL SERPL-MCNC: 130 MG/DL (ref 0–149)
TROPONIN T SERPL-MCNC: 11 NG/L (ref 6–19)
TROPONIN T SERPL-MCNC: <6 NG/L (ref 6–19)
TSH SERPL DL<=0.005 MIU/L-ACNC: 2.3 UIU/ML (ref 0.38–5.33)
WBC # BLD AUTO: 7.3 K/UL (ref 4.8–10.8)

## 2021-11-18 PROCEDURE — A9270 NON-COVERED ITEM OR SERVICE: HCPCS | Performed by: EMERGENCY MEDICINE

## 2021-11-18 PROCEDURE — 700111 HCHG RX REV CODE 636 W/ 250 OVERRIDE (IP): Performed by: STUDENT IN AN ORGANIZED HEALTH CARE EDUCATION/TRAINING PROGRAM

## 2021-11-18 PROCEDURE — 84484 ASSAY OF TROPONIN QUANT: CPT | Mod: 91

## 2021-11-18 PROCEDURE — 85025 COMPLETE CBC W/AUTO DIFF WBC: CPT

## 2021-11-18 PROCEDURE — 83036 HEMOGLOBIN GLYCOSYLATED A1C: CPT

## 2021-11-18 PROCEDURE — 84443 ASSAY THYROID STIM HORMONE: CPT

## 2021-11-18 PROCEDURE — 80061 LIPID PANEL: CPT

## 2021-11-18 PROCEDURE — 99217 PR OBSERVATION CARE DISCHARGE: CPT | Performed by: HOSPITALIST

## 2021-11-18 PROCEDURE — 93010 ELECTROCARDIOGRAM REPORT: CPT | Performed by: INTERNAL MEDICINE

## 2021-11-18 PROCEDURE — 700102 HCHG RX REV CODE 250 W/ 637 OVERRIDE(OP): Performed by: EMERGENCY MEDICINE

## 2021-11-18 PROCEDURE — 96372 THER/PROPH/DIAG INJ SC/IM: CPT

## 2021-11-18 PROCEDURE — 80048 BASIC METABOLIC PNL TOTAL CA: CPT

## 2021-11-18 PROCEDURE — 700102 HCHG RX REV CODE 250 W/ 637 OVERRIDE(OP): Performed by: STUDENT IN AN ORGANIZED HEALTH CARE EDUCATION/TRAINING PROGRAM

## 2021-11-18 PROCEDURE — G0378 HOSPITAL OBSERVATION PER HR: HCPCS

## 2021-11-18 PROCEDURE — A9270 NON-COVERED ITEM OR SERVICE: HCPCS | Performed by: STUDENT IN AN ORGANIZED HEALTH CARE EDUCATION/TRAINING PROGRAM

## 2021-11-18 RX ADMIN — ENOXAPARIN SODIUM 40 MG: 40 INJECTION SUBCUTANEOUS at 05:44

## 2021-11-18 RX ADMIN — ASPIRIN 325 MG: 325 TABLET, COATED ORAL at 05:44

## 2021-11-18 RX ADMIN — OMEPRAZOLE 20 MG: 20 CAPSULE, DELAYED RELEASE ORAL at 05:44

## 2021-11-18 NOTE — CARE PLAN
The patient is Stable - Low risk of patient condition declining or worsening    Shift Goals  Clinical Goals: Monitor chest pain, tele monitoring  Patient Goals: Adequate rest   Family Goals: n/a    Progress made toward(s) clinical / shift goals:  progressing      Problem: Pain - Standard  Goal: Alleviation of pain or a reduction in pain to the patient’s comfort goal  Outcome: Progressing  Note: Patient does not complain of chest pain      Problem: Knowledge Deficit - Standard  Goal: Patient and family/care givers will demonstrate understanding of plan of care, disease process/condition, diagnostic tests and medications  Outcome: Progressing  Note: Discussed POC with patient.  Educated patient about no caffeine including decaf.

## 2021-11-18 NOTE — DISCHARGE SUMMARY
Discharge Summary    CHIEF COMPLAINT ON ADMISSION  Chief Complaint   Patient presents with   • Sent from Urgent Care     Seen there for fatigue, indigestion, and dizziness, but had pain in her R upper back today, pt also had chest pain while waiting at urgent care   • Chest Pain     Today while at urgent care, described as tightness, intermittent, substernal radiating to epigastric, denies cardiac hx. Pt states some relief with eating.        Reason for Admission  CP     Admission Date  11/17/2021    CODE STATUS  Full Code    HPI & HOSPITAL COURSE  This is a 46 y.o. female here with history of acid reflux and cervical stenosis who presented with chest pain. Pain started last Thursday and is intermittent, located in the neck, back, and mid upper chest. She also felt fatigued and was instructed by a nurse hotline to present to the ED. She has a history of acid reflux which usually responds to tums, which it did not this time. She recently was traveling, but denies palpitations, or leg swelling. 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 Patient did recently increased her progesterone dose. Troponin negative times two. No EKG changes. All pain has resolved and patient has remained hemodynamically stable. Stress test not completed due to active lifestyle with no symptoms. Patient treated for acid reflux with omeprazole and encouraged to try Maalox.       Therefore, she is discharged in good and stable condition to home with close outpatient follow-up.    The patient recovered much more quickly than anticipated on admission.    Discharge Date  11/18/2021    FOLLOW UP ITEMS POST DISCHARGE  Follow up with PCP    DISCHARGE DIAGNOSES  Principal Problem (Resolved):    Chest pain POA: Unknown  Active Problems:    Acid reflux POA: Unknown    Cervical stenosis of spine POA: Unknown      FOLLOW UP  No future  appointments.  No follow-up provider specified.    MEDICATIONS ON DISCHARGE     Medication List      CONTINUE taking these medications      Instructions   NON SPECIFIED   Take 2 Capsules by mouth 1 time a day as needed (upset stomach). Tummy Tune Up  Dose: 2 Capsule     PHAZYME PO   Take 2 Tablets by mouth 1 time a day as needed (Gas).  Dose: 2 Tablet     PROBIOTIC PO   Take 1 Capsule by mouth 1 time a day as needed (Upset Stomach).  Dose: 1 Capsule     PROGESTERONE EX   Apply 1 Application topically 2 times a day.  Dose: 1 Application            Allergies  Allergies   Allergen Reactions   • Prochlorperazine Unspecified     Jaw locked up     • Penicillins Unspecified     Childhood         DIET  Orders Placed This Encounter   Procedures   • Diet Order Diet: Cardiac; Miscellaneous modifications: (optional): No Decaf, No Caffeine(for test)     Standing Status:   Standing     Number of Occurrences:   1     Order Specific Question:   Diet:     Answer:   Cardiac [6]     Order Specific Question:   Miscellaneous modifications: (optional)     Answer:   No Decaf, No Caffeine(for test) [11]       ACTIVITY  As tolerated.  Weight bearing as tolerated    CONSULTATIONS  None    PROCEDURES  None    LABORATORY  Lab Results   Component Value Date    SODIUM 139 11/18/2021    POTASSIUM 4.4 11/18/2021    CHLORIDE 106 11/18/2021    CO2 22 11/18/2021    GLUCOSE 97 11/18/2021    BUN 15 11/18/2021    CREATININE 0.93 11/18/2021        Lab Results   Component Value Date    WBC 7.3 11/18/2021    HEMOGLOBIN 13.9 11/18/2021    HEMATOCRIT 42.1 11/18/2021    PLATELETCT 227 11/18/2021        Total time of the discharge process exceeds 32 minutes.

## 2021-11-18 NOTE — PROGRESS NOTES
Assumed pt care at 0700. Received report from Kalyani PARISI. A&O x4. Pt reports no chest pain, SOB, nausea, or palpitations.   Updated on POC, communication board updated. Bed locked and in lowest position. Call light and belongings within reach. Non-skid socks in place. Needs met, will continue to monitor.

## 2021-11-18 NOTE — DISCHARGE INSTRUCTIONS
Discharge Instructions per OBI Saul.  Please follow up with your primary care provider    DIET: healthy    ACTIVITY: as tolerated    DIAGNOSIS: Acid reflux, chest pain rule out    Return to ER if you experience chest pain, shortness of breath, unilateral weakness, uncontrolled pain, or uncontrolled bleeding.       Discharge Instructions    Discharged to home by car with self. Discharged via wheelchair, hospital escort: Yes.  Special equipment needed: Not Applicable    Be sure to schedule a follow-up appointment with your primary care doctor or any specialists as instructed.     Discharge Plan:   Diet Plan: Discussed  Activity Level: Discussed  Confirmed Follow up Appointment: Patient to Call and Schedule Appointment  Confirmed Symptoms Management: Discussed  Medication Reconciliation Updated: Yes  Influenza Vaccine Indication: Patient Refuses    I understand that a diet low in cholesterol, fat, and sodium is recommended for good health. Unless I have been given specific instructions below for another diet, I accept this instruction as my diet prescription.   Other diet: Heart healthy    Special Instructions: None    · Is patient discharged on Warfarin / Coumadin?   No     Depression / Suicide Risk    As you are discharged from this [facility] facility, it is important to learn how to keep safe from harming yourself.    Recognize the warning signs:  · Abrupt changes in personality, positive or negative- including increase in energy   · Giving away possessions  · Change in eating patterns- significant weight changes-  positive or negative  · Change in sleeping patterns- unable to sleep or sleeping all the time   · Unwillingness or inability to communicate  · Depression  · Unusual sadness, discouragement and loneliness  · Talk of wanting to die  · Neglect of personal appearance   · Rebelliousness- reckless behavior  · Withdrawal from people/activities they love  · Confusion- inability to  concentrate     If you or a loved one observes any of these behaviors or has concerns about self-harm, here's what you can do:  · Talk about it- your feelings and reasons for harming yourself  · Remove any means that you might use to hurt yourself (examples: pills, rope, extension cords, firearm)  · Get professional help from the community (Mental Health, Substance Abuse, psychological counseling)  · Do not be alone:Call your Safe Contact- someone whom you trust who will be there for you.  · Call your local CRISIS HOTLINE 361-3417 or 626-990-1045  · Call your local Children's Mobile Crisis Response Team [place] (197) 629-5976 or www.[employer]  · Call the toll free National Suicide Prevention Hotlines   · National Suicide Prevention Lifeline 436-705-PUTP (0381)  · National Hope Line Network 800-SUICIDE (835-5097)

## 2021-11-18 NOTE — PROGRESS NOTES
Bedside report received from day shift RN. Assumed care at 1900. Pt is A&Ox4. Pt is sitting up in bed. Patient is on RA. Pt was updated on plan of care. Pt has call light, personal belongings, and bedside table within reach. Bed locked and in lowest position.

## 2022-06-30 ENCOUNTER — OFFICE VISIT (OUTPATIENT)
Dept: MEDICAL GROUP | Facility: PHYSICIAN GROUP | Age: 47
End: 2022-06-30
Payer: COMMERCIAL

## 2022-06-30 VITALS
BODY MASS INDEX: 25.54 KG/M2 | DIASTOLIC BLOOD PRESSURE: 78 MMHG | OXYGEN SATURATION: 100 % | SYSTOLIC BLOOD PRESSURE: 108 MMHG | WEIGHT: 149.6 LBS | HEIGHT: 64 IN | HEART RATE: 94 BPM | TEMPERATURE: 98.6 F

## 2022-06-30 DIAGNOSIS — B00.9 HSV-2 (HERPES SIMPLEX VIRUS 2) INFECTION: ICD-10-CM

## 2022-06-30 DIAGNOSIS — Z00.00 ENCOUNTER FOR MEDICAL EXAMINATION TO ESTABLISH CARE: ICD-10-CM

## 2022-06-30 DIAGNOSIS — Z12.4 ENCOUNTER FOR PAPANICOLAOU SMEAR FOR CERVICAL CANCER SCREENING: ICD-10-CM

## 2022-06-30 DIAGNOSIS — K21.9 GASTROESOPHAGEAL REFLUX DISEASE WITHOUT ESOPHAGITIS: ICD-10-CM

## 2022-06-30 DIAGNOSIS — N92.6 IRREGULAR MENSTRUAL CYCLE: ICD-10-CM

## 2022-06-30 DIAGNOSIS — M48.02 CERVICAL STENOSIS OF SPINE: ICD-10-CM

## 2022-06-30 DIAGNOSIS — Z23 NEED FOR VACCINATION: ICD-10-CM

## 2022-06-30 DIAGNOSIS — Z12.31 ENCOUNTER FOR SCREENING MAMMOGRAM FOR BREAST CANCER: ICD-10-CM

## 2022-06-30 PROBLEM — R79.89 ELEVATED LFTS: Status: RESOLVED | Noted: 2020-12-03 | Resolved: 2022-06-30

## 2022-06-30 PROCEDURE — 90471 IMMUNIZATION ADMIN: CPT

## 2022-06-30 PROCEDURE — 90715 TDAP VACCINE 7 YRS/> IM: CPT

## 2022-06-30 PROCEDURE — 99214 OFFICE O/P EST MOD 30 MIN: CPT | Mod: 25

## 2022-06-30 RX ORDER — ACYCLOVIR 400 MG/1
400 TABLET ORAL 3 TIMES DAILY
Qty: 45 TABLET | Refills: 3 | Status: SHIPPED | OUTPATIENT
Start: 2022-06-30 | End: 2022-07-05

## 2022-06-30 ASSESSMENT — PATIENT HEALTH QUESTIONNAIRE - PHQ9: CLINICAL INTERPRETATION OF PHQ2 SCORE: 0

## 2022-06-30 ASSESSMENT — FIBROSIS 4 INDEX: FIB4 SCORE: 1.19

## 2022-06-30 NOTE — ASSESSMENT & PLAN NOTE
Patient has been dealing with this for years.   She had been managing this well on her own with stretching and massage, but then she spent a long period of time in a car driving long distance and she thinks this may be responsible for her return of symptoms.   She is followed by Dr. Manuel with [facility].  Dr. Manuel suspects she may need surgery.  She has an MRI scheduled for July 12 and then a decision will be made on neck steps.

## 2022-06-30 NOTE — ASSESSMENT & PLAN NOTE
Patient states she had recently started a progestin cream when symptoms started. She stopped using the cream and her reflux symptoms resolved.

## 2022-06-30 NOTE — ASSESSMENT & PLAN NOTE
Patient states her cycles are changing over the last year.  Cycles are becoming shorter and occurring farther apart.  She has an increase in her PMS symptoms and her flow is heavier.  She states her mother has a history of fibroids and is wondering if this is hereditary.  She is also wondering if her symptoms could be due to premenopause.

## 2022-06-30 NOTE — PROGRESS NOTES
CC:   Chief Complaint   Patient presents with   • Establish Care   • Menopause     Pre-menopause       HPI:  Avani is a pleasant 46 y.o. female here today to establish care and discuss symptoms that may be a sign of premenopause.  She lives in [place] and was established with a provider there and more recently saw SARAH Padron.    Irregular menstrual cycle  Patient states her cycles are changing over the last year.  Cycles are becoming shorter and occurring farther apart.  She has an increase in her PMS symptoms and her flow is heavier.  She states her mother has a history of fibroids and is wondering if this could be attributed to development of fibroids as well or if the symptoms could be due to premenopause.  Patient also states that she has 2 friends that recently had gynecologic cancers, 1 of which recently passed away.  She states she wants to do her due diligence and ensure she is screening for all the appropriate risks.    HSV-2 (herpes simplex virus 2) infection  Never officially diagnosed with a blood test.  She developed sores when she was in high school.  Gets outbreaks when her body is stressed, usually during her period.  She uses acyclovir as needed. Requesting a refill.     Acid reflux  Patient states she had recently started a progestin cream when symptoms started. She stopped using the cream and her reflux symptoms resolved.    Cervical stenosis of spine  Patient states she has been dealing with this for years.   She had been managing this well on her own with stretching and massage, but then she spent a long period of time in a car driving long distance and she thinks this may be responsible for her return of symptoms.   She is followed by Dr. Manuel with [facility].  Dr. Manuel suspects she may need surgery.  She has an MRI scheduled for July 12 and then a decision will be made on neck steps.      Patient Active Problem List   Diagnosis   • Acid reflux   • Cervical stenosis of  spine   • HSV-2 (herpes simplex virus 2) infection   • Irregular menstrual cycle         Current Outpatient Medications   Medication Sig Dispense Refill   • acyclovir (ZOVIRAX) 400 MG tablet Take 1 Tablet by mouth 3 times a day for 5 days. 45 Tablet 3     No current facility-administered medications for this visit.       Allergies as of 06/30/2022 - Reviewed 06/30/2022   Allergen Reaction Noted   • Prochlorperazine Unspecified 11/17/2021   • Penicillins Unspecified 08/31/2020        Social History     Socioeconomic History   • Marital status:      Spouse name: Not on file   • Number of children: Not on file   • Years of education: Not on file   • Highest education level: Not on file   Occupational History   • Not on file   Tobacco Use   • Smoking status: Never Smoker   • Smokeless tobacco: Never Used   Vaping Use   • Vaping Use: Never used   Substance and Sexual Activity   • Alcohol use: Not Currently   • Drug use: Not Currently   • Sexual activity: Not on file     Comment: 2002   Other Topics Concern   • Not on file   Social History Narrative   • Not on file     Social Determinants of Health     Financial Resource Strain: Not on file   Food Insecurity: Not on file   Transportation Needs: Not on file   Physical Activity: Not on file   Stress: Not on file   Social Connections: Not on file   Intimate Partner Violence: Not on file   Housing Stability: Not on file       Family History   Problem Relation Age of Onset   • Lung Disease Mother    • Cancer Mother         Lung Cancer   • Diabetes Father    • Heart Disease Father        Past Medical History:   Diagnosis Date   • Elevated LFTs 12/3/2020        No past surgical history on file.    Labs: Reviewed from an ER visit on 11/18/2021.    ROS:  Gen: no fevers/chills, no changes in weight  Eyes: no changes in vision  ENT: no sore throat, no hearing loss, no bloody nose  Pulm: no sob, no cough  CV: no chest pain, no palpitations  GI: no nausea/vomiting, no  diarrhea  : no dysuria  MSk: no myalgias  Skin: no rash  Neuro: no headaches, no numbness/tingling  Heme/Lymph: no easy bruising     Exam:   Vitals:    06/30/22 0947   BP: 108/78   Pulse: 94   Temp: 37 °C (98.6 °F)   SpO2: 100%     Body mass index is 25.68 kg/m².    General: Normal appearing. No distress.  Head: Normocephalic.  Atraumatic.  Eyes: conjunctiva clear, pupils equal and reactive to light accommodation,  Ears: normal shape and contour, canals are clear bilaterally, tympanic membranes are benign  Throat: Oropharynx is without erythema, edema or exudates.   Neck: Supple without JVD or bruit.Thyroid is not enlarged.  Pulmonary: Clear to ausculation.  Normal effort. No rales, ronchi, or wheezing.  Cardiovascular: Regular rate and rhythm without murmur. Carotid and radial pulses are intact and equal bilaterally.  Pedal pulses within normal limits.  Abdomen: Soft, nontender, nondistended.   Neurologic: Grossly intact.  Sensation intact.  Lymph: No cervical or supraclavicular lymph nodes are palpable.  Skin: Warm and dry.  No obvious lesions.  Musculoskeletal: No extremity cyanosis, clubbing, or edema.  Strength 5+ and equal bilaterally in all extremities.  Psych: Normal mood and affect. Alert and oriented x3. Judgment and insight is normal.      Assessment and Plan.   46 y.o. female presenting with the following.     1. Encounter for medical examination to establish care  Health conditions and medications reviewed and updated. All screenings discussed and up-to-date. Health maintenance completed.     - Lipid Profile; Future  - Comp Metabolic Panel; Future  - CBC WITH DIFFERENTIAL; Future  - VITAMIN D,25 HYDROXY; Future  - TSH WITH REFLEX TO FT4; Future    2. Irregular menstrual cycle  This is a new problem to examiner.  Discussed with patient that fibroids could be genetic, however, they usually are not.  Due to her concerns for cancer as well as fibroids as well as the change in her periods, I will order a  transvaginal ultrasound to ensure there is not an underlying pathology.    - US-PELVIC TRANSVAGINAL ONLY; Future    3. Cervical stenosis of spine  Chronic, not controlled.  Continue close follow-up with Dr. Manuel with [facility].    4. HSV-2 (herpes simplex virus 2) infection  Chronic, controlled with medication.  Refill provided  - acyclovir (ZOVIRAX) 400 MG tablet; Take 1 Tablet by mouth 3 times a day for 5 days.  Dispense: 45 Tablet; Refill: 3    5. Gastroesophageal reflux disease without esophagitis    6. Encounter for screening mammogram for breast cancer  - MA-SCREENING MAMMO BILAT W/TOMOSYNTHESIS W/CAD; Future    7. Encounter for Papanicolaou smear for cervical cancer screening  - Referral to OB/Gyn    8. Need for vaccination  - Tdap =>6yo IM    Educated in proper administration of medication(s) ordered today including safety, possible SE, risks, benefits, rationale and alternatives to therapy.   Supportive care, differential diagnoses, and indications for immediate follow-up discussed with patient.    Pathogenesis of diagnosis discussed including typical length and natural progression.    Instructed to return to clinic or nearest emergency department for any change in condition, further concerns, or worsening of symptoms.  Patient states understanding of the plan of care and discharge instructions.    Return in about 6 weeks (around 8/11/2022) for Wellness Visit, Lab Review.    I spent a total of 32 minutes with record review, exam, and communication with the patient, communication with other providers, and documentation of this encounter. This does not include time spent on separately billable procedures/tests.    I have placed the above orders and discussed them with an approved delegating provider.  The MA is performing the below orders under the direction of Dr. Robledo.    Please note that this dictation was created using voice recognition software. I have worked with consultants from the vendor as well  as technical experts from formerly [facility] to optimize the interface. I have made every reasonable attempt to correct obvious errors, but I expect that there are errors of grammar and possibly content that I did not discover before finalizing the note.

## 2022-06-30 NOTE — ASSESSMENT & PLAN NOTE
Never officially diagnosed with a blood test.  She developed sores when she was in high school.  Gets outbreaks when her body is stressed. Usually during her period.  She uses acyclovir as needed. Requesting a refill.

## 2022-07-20 ENCOUNTER — HOSPITAL ENCOUNTER (OUTPATIENT)
Dept: RADIOLOGY | Facility: MEDICAL CENTER | Age: 47
End: 2022-07-20
Payer: COMMERCIAL

## 2022-08-02 ENCOUNTER — APPOINTMENT (OUTPATIENT)
Dept: RADIOLOGY | Facility: MEDICAL CENTER | Age: 47
End: 2022-08-02
Payer: COMMERCIAL

## 2022-08-30 ENCOUNTER — OFFICE VISIT (OUTPATIENT)
Dept: URGENT CARE | Facility: PHYSICIAN GROUP | Age: 47
End: 2022-08-30
Payer: COMMERCIAL

## 2022-08-30 ENCOUNTER — HOSPITAL ENCOUNTER (OUTPATIENT)
Facility: MEDICAL CENTER | Age: 47
End: 2022-08-30
Attending: FAMILY MEDICINE
Payer: COMMERCIAL

## 2022-08-30 VITALS
HEIGHT: 64 IN | RESPIRATION RATE: 16 BRPM | DIASTOLIC BLOOD PRESSURE: 80 MMHG | BODY MASS INDEX: 24.75 KG/M2 | WEIGHT: 145 LBS | OXYGEN SATURATION: 99 % | HEART RATE: 88 BPM | SYSTOLIC BLOOD PRESSURE: 116 MMHG | TEMPERATURE: 98.7 F

## 2022-08-30 DIAGNOSIS — N89.8 VAGINAL ODOR: ICD-10-CM

## 2022-08-30 LAB
APPEARANCE UR: NORMAL
BILIRUB UR STRIP-MCNC: NEGATIVE MG/DL
COLOR UR AUTO: NORMAL
GLUCOSE UR STRIP.AUTO-MCNC: NEGATIVE MG/DL
KETONES UR STRIP.AUTO-MCNC: NEGATIVE MG/DL
LEUKOCYTE ESTERASE UR QL STRIP.AUTO: NEGATIVE
NITRITE UR QL STRIP.AUTO: NEGATIVE
PH UR STRIP.AUTO: 6 [PH] (ref 5–8)
PROT UR QL STRIP: NEGATIVE MG/DL
RBC UR QL AUTO: NEGATIVE
SP GR UR STRIP.AUTO: 1.01
UROBILINOGEN UR STRIP-MCNC: 0.2 MG/DL

## 2022-08-30 PROCEDURE — 87660 TRICHOMONAS VAGIN DIR PROBE: CPT

## 2022-08-30 PROCEDURE — 87480 CANDIDA DNA DIR PROBE: CPT

## 2022-08-30 PROCEDURE — 99213 OFFICE O/P EST LOW 20 MIN: CPT | Performed by: FAMILY MEDICINE

## 2022-08-30 PROCEDURE — 81002 URINALYSIS NONAUTO W/O SCOPE: CPT | Performed by: FAMILY MEDICINE

## 2022-08-30 PROCEDURE — 87510 GARDNER VAG DNA DIR PROBE: CPT

## 2022-08-30 ASSESSMENT — FIBROSIS 4 INDEX: FIB4 SCORE: 1.19

## 2022-08-31 DIAGNOSIS — B96.89 BV (BACTERIAL VAGINOSIS): ICD-10-CM

## 2022-08-31 DIAGNOSIS — N89.8 VAGINAL ODOR: ICD-10-CM

## 2022-08-31 DIAGNOSIS — N76.0 BV (BACTERIAL VAGINOSIS): ICD-10-CM

## 2022-08-31 LAB
CANDIDA DNA VAG QL PROBE+SIG AMP: NEGATIVE
G VAGINALIS DNA VAG QL PROBE+SIG AMP: POSITIVE
T VAGINALIS DNA VAG QL PROBE+SIG AMP: NEGATIVE

## 2022-08-31 RX ORDER — METRONIDAZOLE 500 MG/1
500 TABLET ORAL 2 TIMES DAILY
Qty: 14 TABLET | Refills: 0 | Status: SHIPPED | OUTPATIENT
Start: 2022-08-31 | End: 2022-09-07

## 2022-08-31 NOTE — PROGRESS NOTES
"  Subjective:      46 y.o. female presents to urgent care for vaginal changes.  Last week she started noticing a different odor to her vaginal discharge, she describes it as smelling like a dead animal\", is fairly constant, but more pronounced after sexual intercourse.  No associated change in color or amount of vaginal discharge.  There is some vaginal itching, but no pain.  She is currently sexually active with one, male partner, and she has had a tubal ligation for contraception.  She has genital herpes, most recent outbreak was about 3 months ago.  Otherwise no history of STI.  She denies any changes to urinary urgency, frequency, dysuria, or hematuria.  Bowel movements remain regular and soft.    She denies any other questions or concerns at this time.    Current problem list, medication, and past medical/surgical history were reviewed in Epic.    ROS  See HPI     Objective:      /80 (BP Location: Left arm, Patient Position: Sitting, BP Cuff Size: Adult)   Pulse 88   Temp 37.1 °C (98.7 °F) (Temporal)   Resp 16   Ht 1.626 m (5' 4\")   Wt 65.8 kg (145 lb)   SpO2 99%   BMI 24.89 kg/m²     Physical Exam  Constitutional:       General: She is not in acute distress.     Appearance: She is not diaphoretic.   Cardiovascular:      Rate and Rhythm: Normal rate and regular rhythm.      Heart sounds: Normal heart sounds.   Pulmonary:      Effort: Pulmonary effort is normal. No respiratory distress.      Breath sounds: Normal breath sounds.   Abdominal:      General: Bowel sounds are normal.      Tenderness: There is no right CVA tenderness or left CVA tenderness.   Neurological:      Mental Status: She is alert.   Psychiatric:         Mood and Affect: Affect normal.         Judgment: Judgment normal.     Assessment/Plan:     1. Vaginal odor  No signs of infection on urinalysis.  Self swab for vaginal DNA pathogens has been sent.  We will treat accordingly.  - VAGINAL PATHOGENS DNA PANEL; Future  - POCT " Urinalysis      Instructed to return to Urgent Care or nearest Emergency Department if symptoms fail to improve, for any change in condition, further concerns, or new concerning symptoms. Patient states understanding of the plan of care and discharge instructions.    Eliz Rivera M.D.

## 2022-09-13 ENCOUNTER — HOSPITAL ENCOUNTER (OUTPATIENT)
Dept: RADIOLOGY | Facility: MEDICAL CENTER | Age: 47
End: 2022-09-13
Payer: COMMERCIAL

## 2022-09-13 DIAGNOSIS — Z12.31 ENCOUNTER FOR SCREENING MAMMOGRAM FOR BREAST CANCER: ICD-10-CM

## 2022-09-13 PROCEDURE — 77063 BREAST TOMOSYNTHESIS BI: CPT

## 2022-09-22 ENCOUNTER — HOSPITAL ENCOUNTER (OUTPATIENT)
Dept: RADIOLOGY | Facility: MEDICAL CENTER | Age: 47
End: 2022-09-22
Payer: COMMERCIAL

## 2022-09-22 DIAGNOSIS — N92.6 IRREGULAR MENSTRUAL CYCLE: ICD-10-CM

## 2022-09-22 PROCEDURE — 76830 TRANSVAGINAL US NON-OB: CPT

## 2022-10-20 ENCOUNTER — OFFICE VISIT (OUTPATIENT)
Dept: MEDICAL GROUP | Facility: PHYSICIAN GROUP | Age: 47
End: 2022-10-20
Payer: COMMERCIAL

## 2022-10-20 VITALS
BODY MASS INDEX: 25.47 KG/M2 | TEMPERATURE: 99.4 F | HEART RATE: 85 BPM | HEIGHT: 64 IN | DIASTOLIC BLOOD PRESSURE: 66 MMHG | WEIGHT: 149.2 LBS | OXYGEN SATURATION: 100 % | SYSTOLIC BLOOD PRESSURE: 108 MMHG

## 2022-10-20 DIAGNOSIS — D21.9 FIBROID: ICD-10-CM

## 2022-10-20 DIAGNOSIS — N92.6 IRREGULAR MENSTRUAL CYCLE: ICD-10-CM

## 2022-10-20 DIAGNOSIS — E55.9 VITAMIN D DEFICIENCY: ICD-10-CM

## 2022-10-20 PROCEDURE — 99213 OFFICE O/P EST LOW 20 MIN: CPT

## 2022-10-20 RX ORDER — ACYCLOVIR 400 MG/1
TABLET ORAL
COMMUNITY
Start: 2022-09-27

## 2022-10-20 ASSESSMENT — FIBROSIS 4 INDEX: FIB4 SCORE: 1.19

## 2022-10-20 NOTE — ASSESSMENT & PLAN NOTE
New problem detected on transvaginal ultrasound.  Referral placed to OB/GYN for management and treatment.

## 2022-10-20 NOTE — PROGRESS NOTES
"Subjective:     CC:   Chief Complaint   Patient presents with    Lab Results       HISTORY OF THE PRESENT ILLNESS: Avani is a pleasant 46 y.o. female here today to follow-up on lab results and results from a transvaginal ultrasound.    Problem   Fibroid    Transvaginal ultrasound shows 2.5 cm fibroid.  This was done after concerns  about her menstrual cycle becoming irregular and occurring further apart.  She also states her flow is much heavier.  Her mother has a history of fibroids and so she was concerned she had fibroids.     Vitamin D Deficiency       Health Maintenance: Completed    ROS:  All systems negative expect as addressed in assessment and plan.     Objective:     Exam:  /66 (BP Location: Left arm, Patient Position: Sitting, BP Cuff Size: Adult)   Pulse 85   Temp 37.4 °C (99.4 °F) (Temporal)   Ht 1.626 m (5' 4\")   Wt 67.7 kg (149 lb 3.2 oz)   SpO2 100%   BMI 25.61 kg/m²  Body mass index is 25.61 kg/m².    Physical Exam  Vitals reviewed.   Constitutional:       Appearance: Normal appearance.   Cardiovascular:      Rate and Rhythm: Normal rate.   Pulmonary:      Effort: Pulmonary effort is normal.   Musculoskeletal:         General: Normal range of motion.   Skin:     General: Skin is warm and dry.   Neurological:      Mental Status: Mental status is at baseline.   Psychiatric:         Mood and Affect: Mood normal.         Behavior: Behavior normal.     Labs: Results reviewed from 07/09/22. Results in media file.    Assessment & Plan:     46 y.o. female with the following -    1. Fibroid  2. Irregular menstrual cycle  New problem detected on transvaginal ultrasound.  Referral placed to OB/GYN for management and treatment.  - Referral to OB/Gyn    3. Vitamin D deficiency  New problem.  Vitamin D 28 on recent labs.  Patient to start Vitamin D 1,000 units     Other orders  - acyclovir (ZOVIRAX) 400 MG tablet; TAKE 1 TABLET BY MOUTH THREE TIMES DAILY FOR 5 DAYS    Patient was educated in proper " administration of medication(s) ordered today including safety, possible SE, risks, benefits, rationale and alternatives to therapy.   Supportive care, differential diagnoses, and indications for immediate follow-up discussed with patient.    Pathogenesis of diagnosis discussed including typical length and natural progression.    Instructed to return to clinic or nearest emergency department for any change in condition, further concerns, or worsening of symptoms.  Patient states understanding of the plan of care and discharge instructions.    Return in about 1 year (around 10/20/2023) for Wellness Visit, Lab Review.    I spent a total of 25 minutes with record review, exam, and communication with the patient, communication with other providers, and documentation of this encounter. This does not include time spent on separately billable procedures/tests.    I have placed the above orders and discussed them with an approved delegating provider.  The MA is performing the below orders under the direction of Dr. Robledo.    Please note that this dictation was created using voice recognition software. I have worked with consultants from the vendor as well as technical experts from [facility] to optimize the interface. I have made every reasonable attempt to correct obvious errors, but I expect that there are errors of grammar and possibly content that I did not discover before finalizing the note.

## 2023-01-18 ENCOUNTER — HOSPITAL ENCOUNTER (OUTPATIENT)
Facility: MEDICAL CENTER | Age: 48
End: 2023-01-18
Payer: COMMERCIAL

## 2023-01-18 ENCOUNTER — HOSPITAL ENCOUNTER (OUTPATIENT)
Dept: LAB | Facility: MEDICAL CENTER | Age: 48
End: 2023-01-18

## 2023-01-18 PROCEDURE — 88175 CYTOPATH C/V AUTO FLUID REDO: CPT

## 2023-01-18 PROCEDURE — 87624 HPV HI-RISK TYP POOLED RSLT: CPT

## 2023-01-19 LAB
CYTOLOGY REG CYTOL: NORMAL
HPV HR 12 DNA CVX QL NAA+PROBE: NEGATIVE
HPV16 DNA SPEC QL NAA+PROBE: NEGATIVE
HPV18 DNA SPEC QL NAA+PROBE: NEGATIVE
SPECIMEN SOURCE: NORMAL

## 2023-01-24 ENCOUNTER — HOSPITAL ENCOUNTER (OUTPATIENT)
Dept: LAB | Facility: MEDICAL CENTER | Age: 48
End: 2023-01-24
Payer: COMMERCIAL

## 2023-01-24 LAB
HCT VFR BLD AUTO: 39.5 % (ref 37–47)
HGB BLD-MCNC: 12.9 G/DL (ref 12–16)
TSH SERPL DL<=0.005 MIU/L-ACNC: 1.06 UIU/ML (ref 0.38–5.33)

## 2023-01-24 PROCEDURE — 85014 HEMATOCRIT: CPT

## 2023-01-24 PROCEDURE — 36415 COLL VENOUS BLD VENIPUNCTURE: CPT

## 2023-01-24 PROCEDURE — 84443 ASSAY THYROID STIM HORMONE: CPT

## 2023-01-24 PROCEDURE — 85018 HEMOGLOBIN: CPT

## 2023-04-26 ENCOUNTER — APPOINTMENT (OUTPATIENT)
Dept: RADIOLOGY | Facility: MEDICAL CENTER | Age: 48
End: 2023-04-26
Attending: OBSTETRICS & GYNECOLOGY
Payer: COMMERCIAL

## 2023-04-26 DIAGNOSIS — R10.2 PELVIC PAIN IN FEMALE: ICD-10-CM

## 2023-04-26 DIAGNOSIS — N80.03 ADENOMYOSIS OF UTERUS: ICD-10-CM

## 2023-04-26 DIAGNOSIS — D25.9 UTERINE LEIOMYOMA, UNSPECIFIED LOCATION: ICD-10-CM

## 2023-04-26 PROCEDURE — A9579 GAD-BASE MR CONTRAST NOS,1ML: HCPCS | Performed by: OBSTETRICS & GYNECOLOGY

## 2023-04-26 PROCEDURE — 72197 MRI PELVIS W/O & W/DYE: CPT

## 2023-04-26 PROCEDURE — 700117 HCHG RX CONTRAST REV CODE 255: Performed by: OBSTETRICS & GYNECOLOGY

## 2023-04-26 RX ADMIN — GADOTERIDOL 15 ML: 279.3 INJECTION, SOLUTION INTRAVENOUS at 16:01

## 2023-09-21 ENCOUNTER — OFFICE VISIT (OUTPATIENT)
Dept: MEDICAL GROUP | Facility: PHYSICIAN GROUP | Age: 48
End: 2023-09-21
Payer: COMMERCIAL

## 2023-09-21 VITALS
BODY MASS INDEX: 24.11 KG/M2 | WEIGHT: 141.2 LBS | TEMPERATURE: 98.4 F | SYSTOLIC BLOOD PRESSURE: 104 MMHG | DIASTOLIC BLOOD PRESSURE: 72 MMHG | HEART RATE: 76 BPM | OXYGEN SATURATION: 100 % | HEIGHT: 64 IN

## 2023-09-21 DIAGNOSIS — Z12.31 ENCOUNTER FOR SCREENING MAMMOGRAM FOR BREAST CANCER: ICD-10-CM

## 2023-09-21 DIAGNOSIS — R42 DIZZINESS: ICD-10-CM

## 2023-09-21 DIAGNOSIS — Z00.00 WELLNESS EXAMINATION: ICD-10-CM

## 2023-09-21 DIAGNOSIS — E55.9 VITAMIN D DEFICIENCY: ICD-10-CM

## 2023-09-21 DIAGNOSIS — Z12.11 ENCOUNTER FOR SCREENING FOR MALIGNANT NEOPLASM OF COLON: ICD-10-CM

## 2023-09-21 DIAGNOSIS — H81.12 BPPV (BENIGN PAROXYSMAL POSITIONAL VERTIGO), LEFT: ICD-10-CM

## 2023-09-21 PROCEDURE — 3074F SYST BP LT 130 MM HG: CPT

## 2023-09-21 PROCEDURE — 3078F DIAST BP <80 MM HG: CPT

## 2023-09-21 PROCEDURE — 99214 OFFICE O/P EST MOD 30 MIN: CPT

## 2023-09-21 RX ORDER — MECLIZINE HYDROCHLORIDE 25 MG/1
25 TABLET ORAL 3 TIMES DAILY PRN
Qty: 30 TABLET | Refills: 0 | Status: SHIPPED | OUTPATIENT
Start: 2023-09-21

## 2023-09-21 ASSESSMENT — FIBROSIS 4 INDEX: FIB4 SCORE: 1.21

## 2023-09-21 ASSESSMENT — PATIENT HEALTH QUESTIONNAIRE - PHQ9: CLINICAL INTERPRETATION OF PHQ2 SCORE: 0

## 2023-09-21 NOTE — PROGRESS NOTES
"Subjective:     CC:   Chief Complaint   Patient presents with    Vertigo     X6 months       HISTORY OF THE PRESENT ILLNESS: Avani is a pleasant 47 y.o. female here today to discuss vertigo x6 months.    Problem   Dizziness    Dizziness started about six months ago.  She states it occurs when she lays in bed and rolls over.  She states any positional change triggers an episode.  Patient denies spinning but states she gets more lightheaded and off-balance.  Episodes last approximately 10 seconds.         Health Maintenance: Completed    ROS:  All systems negative expect as addressed in assessment and plan.     Objective:     Exam:  /72 (BP Location: Left arm, Patient Position: Sitting, BP Cuff Size: Adult)   Pulse 76   Temp 36.9 °C (98.4 °F) (Temporal)   Ht 1.626 m (5' 4\")   Wt 64 kg (141 lb 3.2 oz)   SpO2 100%   BMI 24.24 kg/m²  Body mass index is 24.24 kg/m².    Physical Exam  Eyes:      Comments: +Nystagmus on left with Winton Hallpike maneuver       Labs: Results in Media File from [facility]    Assessment & Plan:     47 y.o. female with the following -    1. Dizziness  2. BPPV (benign paroxysmal positional vertigo), left  This is a new problem to examiner, chronic to patient.  Rich-Hallpike maneuver positive on the left.  Symptoms consistent with BPPV.  Epley maneuver discussed.  Handout provided.  Meclizine ordered for dizziness episodes.  - meclizine (ANTIVERT) 25 MG Tab; Take 1 Tablet by mouth 3 times a day as needed for Dizziness.  Dispense: 30 Tablet; Refill: 0    3. Encounter for screening for malignant neoplasm of colon  - KIMMIE (FIT DNA)    4. Encounter for screening mammogram for breast cancer  - MA-SCREENING MAMMO BILAT W/TOMOSYNTHESIS W/CAD; Future  - US-BREAST BILAT-LIMITED; Future    5. Wellness examination  - TSH WITH REFLEX TO FT4; Future  - VITAMIN D,25 HYDROXY (DEFICIENCY); Future  - Lipid Profile; Future  - HEMOGLOBIN A1C; Future  - Comp Metabolic Panel; Future  - CBC WITH " DIFFERENTIAL; Future    6. Vitamin D deficiency  - VITAMIN D,25 HYDROXY (DEFICIENCY); Future    Patient was educated in proper administration of medication(s) ordered today including safety, possible SE, risks, benefits, rationale and alternatives to therapy.   Supportive care, differential diagnoses, and indications for immediate follow-up discussed with patient.    Pathogenesis of diagnosis discussed including typical length and natural progression.    Instructed to return to clinic or nearest emergency department for any change in condition, further concerns, or worsening of symptoms.  Patient states understanding of the plan of care and discharge instructions.    Return in about 6 weeks (around 11/2/2023) for Establish with new PCP and Lab review.    I spent a total of 30 minutes with record review, exam, and communication with the patient, communication with other providers, and documentation of this encounter. This does not include time spent on separately billable procedures/tests.    I have placed the above orders and discussed them with an approved delegating provider.  The MA is performing the below orders under the direction of Dr. Robledo.    Please note that this dictation was created using voice recognition software. I have worked with consultants from the vendor as well as technical experts from [employer] to optimize the interface. I have made every reasonable attempt to correct obvious errors, but I expect that there are errors of grammar and possibly content that I did not discover before finalizing the note.

## 2023-09-28 ENCOUNTER — DOCUMENTATION (OUTPATIENT)
Dept: HEALTH INFORMATION MANAGEMENT | Facility: OTHER | Age: 48
End: 2023-09-28
Payer: COMMERCIAL

## 2023-10-10 ENCOUNTER — APPOINTMENT (OUTPATIENT)
Dept: RADIOLOGY | Facility: MEDICAL CENTER | Age: 48
End: 2023-10-10
Payer: COMMERCIAL

## 2023-11-06 ENCOUNTER — HOSPITAL ENCOUNTER (OUTPATIENT)
Dept: LAB | Facility: MEDICAL CENTER | Age: 48
End: 2023-11-06
Payer: COMMERCIAL

## 2023-11-06 ENCOUNTER — OFFICE VISIT (OUTPATIENT)
Dept: MEDICAL GROUP | Facility: PHYSICIAN GROUP | Age: 48
End: 2023-11-06
Payer: COMMERCIAL

## 2023-11-06 VITALS
HEART RATE: 73 BPM | BODY MASS INDEX: 23.9 KG/M2 | TEMPERATURE: 97.7 F | OXYGEN SATURATION: 97 % | HEIGHT: 64 IN | SYSTOLIC BLOOD PRESSURE: 122 MMHG | WEIGHT: 140 LBS | RESPIRATION RATE: 16 BRPM | DIASTOLIC BLOOD PRESSURE: 70 MMHG

## 2023-11-06 DIAGNOSIS — N92.6 IRREGULAR MENSTRUAL CYCLE: ICD-10-CM

## 2023-11-06 DIAGNOSIS — Z76.89 ENCOUNTER TO ESTABLISH CARE: ICD-10-CM

## 2023-11-06 DIAGNOSIS — H81.12 BPPV (BENIGN PAROXYSMAL POSITIONAL VERTIGO), LEFT: ICD-10-CM

## 2023-11-06 DIAGNOSIS — B00.9 HSV-2 (HERPES SIMPLEX VIRUS 2) INFECTION: ICD-10-CM

## 2023-11-06 DIAGNOSIS — E55.9 VITAMIN D DEFICIENCY: ICD-10-CM

## 2023-11-06 DIAGNOSIS — Z00.00 WELLNESS EXAMINATION: ICD-10-CM

## 2023-11-06 PROBLEM — K21.9 ACID REFLUX: Status: RESOLVED | Noted: 2021-11-17 | Resolved: 2023-11-06

## 2023-11-06 LAB
25(OH)D3 SERPL-MCNC: 23 NG/ML (ref 30–100)
ALBUMIN SERPL BCP-MCNC: 4.5 G/DL (ref 3.2–4.9)
ALBUMIN/GLOB SERPL: 2 G/DL
ALP SERPL-CCNC: 57 U/L (ref 30–99)
ALT SERPL-CCNC: 14 U/L (ref 2–50)
ANION GAP SERPL CALC-SCNC: 8 MMOL/L (ref 7–16)
AST SERPL-CCNC: 21 U/L (ref 12–45)
BASOPHILS # BLD AUTO: 0.7 % (ref 0–1.8)
BASOPHILS # BLD: 0.04 K/UL (ref 0–0.12)
BILIRUB SERPL-MCNC: 0.6 MG/DL (ref 0.1–1.5)
BUN SERPL-MCNC: 11 MG/DL (ref 8–22)
CALCIUM ALBUM COR SERPL-MCNC: 8.7 MG/DL (ref 8.5–10.5)
CALCIUM SERPL-MCNC: 9.1 MG/DL (ref 8.5–10.5)
CHLORIDE SERPL-SCNC: 102 MMOL/L (ref 96–112)
CHOLEST SERPL-MCNC: 170 MG/DL (ref 100–199)
CO2 SERPL-SCNC: 25 MMOL/L (ref 20–33)
CREAT SERPL-MCNC: 0.88 MG/DL (ref 0.5–1.4)
EOSINOPHIL # BLD AUTO: 0.26 K/UL (ref 0–0.51)
EOSINOPHIL NFR BLD: 4.7 % (ref 0–6.9)
ERYTHROCYTE [DISTWIDTH] IN BLOOD BY AUTOMATED COUNT: 48.3 FL (ref 35.9–50)
EST. AVERAGE GLUCOSE BLD GHB EST-MCNC: 114 MG/DL
FASTING STATUS PATIENT QL REPORTED: NORMAL
GFR SERPLBLD CREATININE-BSD FMLA CKD-EPI: 80 ML/MIN/1.73 M 2
GLOBULIN SER CALC-MCNC: 2.2 G/DL (ref 1.9–3.5)
GLUCOSE SERPL-MCNC: 85 MG/DL (ref 65–99)
HBA1C MFR BLD: 5.6 % (ref 4–5.6)
HCT VFR BLD AUTO: 42.2 % (ref 37–47)
HDLC SERPL-MCNC: 72 MG/DL
HGB BLD-MCNC: 13.5 G/DL (ref 12–16)
IMM GRANULOCYTES # BLD AUTO: 0.01 K/UL (ref 0–0.11)
IMM GRANULOCYTES NFR BLD AUTO: 0.2 % (ref 0–0.9)
LDLC SERPL CALC-MCNC: 81 MG/DL
LYMPHOCYTES # BLD AUTO: 1.81 K/UL (ref 1–4.8)
LYMPHOCYTES NFR BLD: 32.5 % (ref 22–41)
MCH RBC QN AUTO: 27.3 PG (ref 27–33)
MCHC RBC AUTO-ENTMCNC: 32 G/DL (ref 32.2–35.5)
MCV RBC AUTO: 85.4 FL (ref 81.4–97.8)
MONOCYTES # BLD AUTO: 0.61 K/UL (ref 0–0.85)
MONOCYTES NFR BLD AUTO: 11 % (ref 0–13.4)
NEUTROPHILS # BLD AUTO: 2.84 K/UL (ref 1.82–7.42)
NEUTROPHILS NFR BLD: 50.9 % (ref 44–72)
NRBC # BLD AUTO: 0 K/UL
NRBC BLD-RTO: 0 /100 WBC (ref 0–0.2)
PLATELET # BLD AUTO: 237 K/UL (ref 164–446)
PMV BLD AUTO: 12 FL (ref 9–12.9)
POTASSIUM SERPL-SCNC: 3.9 MMOL/L (ref 3.6–5.5)
PROT SERPL-MCNC: 6.7 G/DL (ref 6–8.2)
RBC # BLD AUTO: 4.94 M/UL (ref 4.2–5.4)
SODIUM SERPL-SCNC: 135 MMOL/L (ref 135–145)
TRIGL SERPL-MCNC: 84 MG/DL (ref 0–149)
TSH SERPL DL<=0.005 MIU/L-ACNC: 1.15 UIU/ML (ref 0.38–5.33)
WBC # BLD AUTO: 5.6 K/UL (ref 4.8–10.8)

## 2023-11-06 PROCEDURE — 84443 ASSAY THYROID STIM HORMONE: CPT

## 2023-11-06 PROCEDURE — 3074F SYST BP LT 130 MM HG: CPT

## 2023-11-06 PROCEDURE — 83036 HEMOGLOBIN GLYCOSYLATED A1C: CPT

## 2023-11-06 PROCEDURE — 99396 PREV VISIT EST AGE 40-64: CPT

## 2023-11-06 PROCEDURE — 82306 VITAMIN D 25 HYDROXY: CPT

## 2023-11-06 PROCEDURE — 85025 COMPLETE CBC W/AUTO DIFF WBC: CPT

## 2023-11-06 PROCEDURE — 80053 COMPREHEN METABOLIC PANEL: CPT

## 2023-11-06 PROCEDURE — 36415 COLL VENOUS BLD VENIPUNCTURE: CPT

## 2023-11-06 PROCEDURE — 3078F DIAST BP <80 MM HG: CPT

## 2023-11-06 PROCEDURE — 80061 LIPID PANEL: CPT

## 2023-11-06 ASSESSMENT — FIBROSIS 4 INDEX: FIB4 SCORE: 1.24

## 2023-11-06 NOTE — ASSESSMENT & PLAN NOTE
Chronic, stable. Continue acyclovir with onset of prodrome. Getting up to 6 outbreaks annually, worse with stress.

## 2023-11-06 NOTE — ASSESSMENT & PLAN NOTE
Chronic, improving. Reports symptoms are worse at night when she lays down. Reports episodes last less than 10 seconds after laying down in bed, and occur intermittently.  Continue meclizine as needed  consider ENT referral for ongoing symptoms

## 2023-11-06 NOTE — ASSESSMENT & PLAN NOTE
Chronic, has used progesterone cream for regulation of menses in the past.   Has found benefit with inflammation management, and intermittent fasting.

## 2023-11-06 NOTE — PROGRESS NOTES
"Subjective:     CC: Diagnoses of Encounter to establish care, HSV-2 (herpes simplex virus 2) infection, Irregular menstrual cycle, and BPPV (benign paroxysmal positional vertigo), left were pertinent to this visit.    Patient presents today to establish care with me.  Prior PCP Zion Hernandez.  She is feeling well today no concerns  , 3 boys (grown). 1 granddaughter (3 mo old).    Works as  for [employer].     HPI:   Avani presents today with    Problem   Bppv (Benign Paroxysmal Positional Vertigo), Left   Hsv-2 (Herpes Simplex Virus 2) Infection   Irregular Menstrual Cycle   Acid Reflux (Resolved)     Health Maintenance: Completed  Cancer screening:   Colorectal cancer screening: Done 10/10/2023, Cologuard negative Cervical cancer screening: Done 1/18/2023, normal  Breast cancer screening: Scheduled 1/31/2024   Infectious disease screening/Immunizaitons  -STI screening: declines  Practices safe sex.    -Immunizaitons:   Influenza: declines  Tetanus: up-to-date: 6/30/32 due  Anticipatory Guidance:  Elicits she is eating a variety of fresh veggies/fruits, lean meats,   2-3 x monthly fast food/junk food  Soda: none; water: 70 oz daily  Exercise: recommended 150 minutes/week; yard work, gym, pool; son is   Substance Abuse: n/a  Safe in relationship. Yes/   Seat belts, bike helmet, gun safety discussed.  Sun protection used.    ROS:  Review of Systems   All other systems reviewed and are negative.      Objective:     Exam:  /70 (BP Location: Right arm, Patient Position: Sitting, BP Cuff Size: Small adult)   Pulse 73   Temp 36.5 °C (97.7 °F) (Temporal)   Resp 16   Ht 1.626 m (5' 4\")   Wt 63.5 kg (140 lb)   SpO2 97%   BMI 24.03 kg/m²  Body mass index is 24.03 kg/m².    Physical Exam  Vitals reviewed.   Constitutional:       General: She is not in acute distress.     Appearance: Normal appearance. She is well-groomed. She is not ill-appearing.   HENT:      Head: " Normocephalic and atraumatic.      Right Ear: Tympanic membrane, ear canal and external ear normal.      Left Ear: Tympanic membrane, ear canal and external ear normal.      Nose: Nose normal.      Mouth/Throat:      Mouth: Mucous membranes are moist.      Pharynx: Oropharynx is clear.   Eyes:      Extraocular Movements: Extraocular movements intact.      Conjunctiva/sclera: Conjunctivae normal.      Pupils: Pupils are equal, round, and reactive to light.   Neck:      Thyroid: No thyromegaly.      Trachea: Trachea normal.   Cardiovascular:      Rate and Rhythm: Normal rate and regular rhythm.      Pulses: Normal pulses.      Heart sounds: Normal heart sounds. No murmur heard.  Pulmonary:      Effort: Pulmonary effort is normal. No respiratory distress.      Breath sounds: Normal breath sounds. No wheezing.   Abdominal:      General: Bowel sounds are normal.   Musculoskeletal:         General: No swelling, tenderness or deformity. Normal range of motion.      Cervical back: Full passive range of motion without pain.   Lymphadenopathy:      Cervical: No cervical adenopathy.   Skin:     General: Skin is warm and dry.      Capillary Refill: Capillary refill takes less than 2 seconds.   Neurological:      General: No focal deficit present.      Mental Status: She is alert and oriented to person, place, and time. Mental status is at baseline.      Cranial Nerves: No cranial nerve deficit.      Sensory: No sensory deficit.      Motor: No weakness.   Psychiatric:         Mood and Affect: Mood normal.         Behavior: Behavior normal.         Labs: completed 11/6/23, results not available.     Assessment & Plan:     48 y.o. female with the following -     Problem List Items Addressed This Visit       HSV-2 (herpes simplex virus 2) infection     Chronic, stable. Continue acyclovir with onset of prodrome. Getting up to 6 outbreaks annually, worse with stress.           Irregular menstrual cycle     Chronic, has used  progesterone cream for regulation of menses in the past.   Has found benefit with inflammation management, and intermittent fasting.          BPPV (benign paroxysmal positional vertigo), left     Chronic, improving. Reports symptoms are worse at night when she lays down. Reports episodes last less than 10 seconds after laying down in bed, and occur intermittently.  Continue meclizine as needed  consider ENT referral for ongoing symptoms          Other Visit Diagnoses       Encounter to establish care              Patient was educated in proper administration of medication(s) ordered today including safety, possible SE, risks, benefits, rationale and alternatives to therapy.   Supportive care, differential diagnoses, and indications for immediate follow-up discussed with patient.    Pathogenesis of diagnosis discussed including typical length and natural progression.    Instructed to return to clinic or nearest emergency department for any change in condition, further concerns, or worsening of symptoms.  Patient states understanding of the plan of care and discharge instructions.    Return in about 1 year (around 11/6/2024), or if symptoms worsen or fail to improve, for wellness.    Please note that this dictation was created using voice recognition software. I have made every reasonable attempt to correct obvious errors, but I expect that there are errors of grammar and possibly content that I did not discover before finalizing the note.

## 2024-01-10 DIAGNOSIS — H81.12 BPPV (BENIGN PAROXYSMAL POSITIONAL VERTIGO), LEFT: ICD-10-CM

## 2024-01-26 ENCOUNTER — APPOINTMENT (OUTPATIENT)
Dept: PHYSICAL THERAPY | Facility: REHABILITATION | Age: 49
End: 2024-01-26
Payer: COMMERCIAL

## 2024-01-29 ENCOUNTER — PHYSICAL THERAPY (OUTPATIENT)
Dept: PHYSICAL THERAPY | Facility: REHABILITATION | Age: 49
End: 2024-01-29
Payer: COMMERCIAL

## 2024-01-29 DIAGNOSIS — H81.12 BPPV (BENIGN PAROXYSMAL POSITIONAL VERTIGO), LEFT: ICD-10-CM

## 2024-01-29 PROCEDURE — 97162 PT EVAL MOD COMPLEX 30 MIN: CPT

## 2024-01-29 PROCEDURE — 95992 CANALITH REPOSITIONING PROC: CPT

## 2024-01-29 ASSESSMENT — ENCOUNTER SYMPTOMS
PAIN SCALE AT LOWEST: 0
PAIN SCALE: 0

## 2024-01-29 NOTE — OP THERAPY EVALUATION
"  Outpatient Physical Therapy  VESTIBULAR EVALUATION    [address]  [address]  [facility] [address]  Phone:  [phone]  Fax:  [phone]    Date of Evaluation: 2024    Patient: Avani Young  YOB: 1975  MRN: [id number]     Referring Provider: KARLA Washburn  [address] [place] [address] [address]  [place] [address]   Referring Diagnosis: BPPV (benign paroxysmal positional vertigo), left [H81.12]     Time Calculation    Start time: 745  Stop time: 826 Time Calculation (min): 41 minutes           Chief Complaint: Vertigo    Visit Diagnoses     ICD-10-CM   1. BPPV (benign paroxysmal positional vertigo), left  H81.12         History of Present Illness:     Mechanism of injury:  Intermittent vertigo x years. Is more frequent, triggered by more activities such as looking up. Rolling to the left. Symptoms last a few seconds. Described as a room vibration sensation, and goes away. No nausea.   Did not try any maneuvers at home and was not sure what to try etc.   Most recent was last night when looking up.    Denies changes in coordination, numbness tingling (reports having cervical stenosis PMH)    Some changes in gait only periodically.       [facility] Provider visit  \"BPPV (benign paroxysmal positional vertigo), left    Chronic, improving. Reports symptoms are worse at night when she lays down. Reports episodes last less than 10 seconds after laying down in bed, and occur intermittently. Continue meclizine as needed. consider ENT referral for ongoing symptoms\"    Headaches: no headaches      Ear problems:  None  Symptoms:     Current symptom ratin    At best symptom ratin  Patient goals:     Other patient goals:  Dec dizziness      Past Medical History:   Diagnosis Date    Elevated LFTs 12/3/2020     Past Surgical History:   Procedure Laterality Date    DENTAL EXTRACTION(S)      wisdom teeth extraction     Social History "     Tobacco Use    Smoking status: Never    Smokeless tobacco: Never   Substance Use Topics    Alcohol use: Not Currently     Family and Occupational History     Socioeconomic History    Marital status:      Spouse name: Not on file    Number of children: Not on file    Years of education: Not on file    Highest education level: Not on file   Occupational History    Not on file       Objective:    Oculomotor Exam:         Details: No spontaneous nystagmus central gaze          Details: No spontaneous nystagmus eccentric gaze    No saccadic eye movements    Smooth pursuit present    Convergence:        Normal convergence    No skew  Active Range of Motion:     Within functional limits  Limb Ataxia Exam:     Finger-to-Nose:         Intention tremor: none        Overshooting: none    Dysdiadochokinesia: pronation-supination  Vertebrobasilar Exam:    Comments: VAT neg bilat  BPPV Exam:     Abnormal [place]-Hallpike        Latency (sec): 2        Duration (sec): 8        Findings: fatigable, positive left and torsional nystagmus    Comments: Loaded sukh hope pike        Therapeutic Treatments and Modalities:     1. Canalith Repositioning (CPT 99318)    Therapeutic Treatment and Modalities Summary: Left epley performed x 3 with loaded sukh hallpikje. First rep latency 2 sec duration 8 sec with symptoms in 3rd position. Second rep no nystagmus with symptoms reoriented x 3 seconds, no symptoms second position but sig dizziness upon sitting up. 3rd rep symptoms sig dec intensity and lasted 1-2 sec, no symptoms 3rd position or sitting up.    Educated on anatomy and physiology of vestibular system and BPPV using model.  Educated on post maneuver precautions including avoiding laying on side, avoiding large head movements for today and avoiding laying down or reclining when getting home.     Time-based treatments/modalities:             Assessment:     Functional impairments:  Positive BPPV (left)    Assessment details:  Pt is  48 year old female who presents to PT with chief complaint of intermittent vertigo for years.  PT evaluation is most consistent with left canalithiasis type BPPV. This limits the patients bed mobility, functional ADLs, and inherently makes them a higher risk for falling. Skilled PT required to assist with resolving BPPV as indicated through canalith repositioning maneuvers and ensure return to PLOF.     Barriers to therapy:  None    Prognosis: good    Goals:     Short term goals:  Resolve nystagmus in left Rich hallpike.  Pt will be independent with home canalith repositioning maneuver with family support as needed.  Pt will complete most appropriate balance assessment.        Short term goal time span:  1-2 weeks    Long term goals:  Pt will report no symptoms with bed mobility.  Demonstrate subjective improvement with DHI less than 10%.  Pt will score low fall risk on appropriate outcome measure.      Long term goal time span:  4-6 weeks  Plan:     Therapy options:  Physical therapy treatment to continue    Planned therapy interventions:  Canalith Repositioning (CPT 97592), Therapeutic Activities (CPT 60366), Therapeutic Exercise (CPT 36927), Functional Training, Self Care (CPT 99237) and Neuromuscular Re-education (CPT 70334)    Frequency:  2x week    Duration in weeks:  6    Discussed with:  Patient      Functional Assessment Used  Dizziness Handicap Inventory - Physical Items Score: 20  Dizziness Handicap Inventory - Emotional Items Score: 4  Dizziness Handicap Inventory - Functional Items Score: 6  Dizziness Handicap Inventory - Total Score: 30       Referring provider co-signature:  I have reviewed this plan of care and my co-signature certifies the need for services.    Certification Period: 01/29/2024 to  03/11/24    Physician Signature: ________________________________ Date: ______________

## 2024-01-31 ENCOUNTER — HOSPITAL ENCOUNTER (OUTPATIENT)
Dept: RADIOLOGY | Facility: MEDICAL CENTER | Age: 49
End: 2024-01-31
Payer: COMMERCIAL

## 2024-01-31 DIAGNOSIS — Z12.31 ENCOUNTER FOR SCREENING MAMMOGRAM FOR BREAST CANCER: ICD-10-CM

## 2024-01-31 PROCEDURE — 77067 SCR MAMMO BI INCL CAD: CPT

## 2024-01-31 PROCEDURE — 76641 ULTRASOUND BREAST COMPLETE: CPT

## 2024-02-01 ENCOUNTER — PHYSICAL THERAPY (OUTPATIENT)
Dept: PHYSICAL THERAPY | Facility: REHABILITATION | Age: 49
End: 2024-02-01
Payer: COMMERCIAL

## 2024-02-01 DIAGNOSIS — H81.12 BPPV (BENIGN PAROXYSMAL POSITIONAL VERTIGO), LEFT: ICD-10-CM

## 2024-02-01 PROCEDURE — 95992 CANALITH REPOSITIONING PROC: CPT

## 2024-02-01 PROCEDURE — 97535 SELF CARE MNGMENT TRAINING: CPT

## 2024-02-01 NOTE — OP THERAPY DAILY TREATMENT
Outpatient Physical Therapy  DAILY TREATMENT     [facility] Physical [address]  [address]  [place] [address]  Phone:  [phone]  Fax:  [phone]    Date: 02/01/2024    Patient: Avani Young  YOB: 1975  MRN: [id number]     Time Calculation    Start time: 1130  Stop time: 1201 Time Calculation (min): 31 minutes         Chief Complaint: Vertigo    Visit #: 2    SUBJECTIVE:  Felt ok last few days. Feeling a little tired today.     OBJECTIVE:  Current objective measures:    Abnormal Sukh-Hallpike        Latency (sec): 2        Duration (sec): 8        Findings: fatigable, positive left and torsional nystagmus    Comments: Loaded sukh hope pike                Therapeutic Exercises (CPT 93018):     20. 1/29-3/11    Therapeutic Treatments and Modalities:     1. Canalith Repositioning (CPT 65496)    2. Self Care ADL Training (CPT 71512), functional training    Therapeutic Treatment and Modalities Summary: Left epley performed with loaded sukh hallpike x 2 reps. Torsinoal upbeating nystagmus noted. First latency 3 sec, duration 8 sec, second rep latency 1 sec, duration 2 sec. No symptoms when reassessed on 3rd      Functional training:  Educated on anatomy and physiology of vestibular system and BPPV using model.  Educated on post maneuver precautions including avoiding laying on side, avoiding large head movements for today and avoiding laying down or reclining when getting home.     Time-based treatments/modalities:    Physical Therapy Timed Treatment Charges  Functional training, self care minutes (CPT 51672): 10 minutes      ASSESSMENT:   Response to treatment: Able to resolve nystagmus today in left sukh hallpike, will cont to reassess.     PLAN/RECOMMENDATIONS:   Plan for treatment: therapy treatment to continue next visit.  Planned interventions for next visit: continue with current treatment.

## 2024-02-08 ENCOUNTER — PHYSICAL THERAPY (OUTPATIENT)
Dept: PHYSICAL THERAPY | Facility: REHABILITATION | Age: 49
End: 2024-02-08
Payer: COMMERCIAL

## 2024-02-08 DIAGNOSIS — H81.12 BPPV (BENIGN PAROXYSMAL POSITIONAL VERTIGO), LEFT: ICD-10-CM

## 2024-02-08 PROCEDURE — 97535 SELF CARE MNGMENT TRAINING: CPT

## 2024-02-08 PROCEDURE — 95992 CANALITH REPOSITIONING PROC: CPT

## 2024-02-08 NOTE — OP THERAPY DAILY TREATMENT
Outpatient Physical Therapy  DAILY TREATMENT     [facility] Physical [address].  [address]  [place] [address]  Phone:  [phone]  Fax:  [phone]    Date: 02/08/2024    Patient: Avani Young  YOB: 1975  MRN: [id number]     Time Calculation    Start time: 1045  Stop time: 1110 Time Calculation (min): 25 minutes         Chief Complaint: Vertigo    Visit #: 3    Mild symptoms putting on shoes, none with bed mobility.    OBJECTIVE:  Current objective measures:    Abnormal [place]-Hallpike        Latency (sec): 2        Duration (sec): 8        Findings: fatigable, positive left and torsional nystagmus    Comments: Loaded sukh hope pike                Therapeutic Exercises (CPT 18092):     20. 1/29-3/11    Therapeutic Treatments and Modalities:     1. Canalith Repositioning (CPT 89649)    2. Self Care ADL Training (CPT 14079), functional training    Therapeutic Treatment and Modalities Summary: Left epley performed with loaded sukh hallpike x 1 rep. Torsional nystagmus latency 0 duration 1 sec. Reassess sukh hallpike no symptoms or nystagmus      Functional training:Significant time educating pt on self Epley including handout. On handout documented how to identify appropriate Rt/Lt side maneuver to complete, hold each position for 30 seconds after symptoms, emphasis on not picking head up while transition from 1st to 2nd position to dec risk of dumping in to horizontal canal, turning nose to ground with chin tuck in 3rd position, as well as keeping head pointed down while sitting up in final position. Educated to completed 2-3x with rests between for 2-3 days if symptoms continue. After 2-3 days if continues to have symptoms then should seek referral for PT. Education provided Epley to not be completed as a preventative treatment and only if experiencing symptoms. Pt demonstrated x 2 with SPV first rep,independent with use of handout on second rep.     Time-based  treatments/modalities:    Physical Therapy Timed Treatment Charges  Functional training, self care minutes (CPT 21111): 10 minutes      ASSESSMENT:   Response to treatment: Able to resolve nystagmus today in left sukh hallpike. Due to very min symptoms and pt commute to sessions did provide and review education for self epley. If ellen has symptoms she will return next week, if not will cancel. Will leave chart open x 30 days and will DC if no appointments scheduled, pt verbalized agreement.     PLAN/RECOMMENDATIONS:   Plan for treatment: therapy treatment to continue next visit.  Planned interventions for next visit: continue with current treatment.

## 2024-02-16 ENCOUNTER — APPOINTMENT (OUTPATIENT)
Dept: PHYSICAL THERAPY | Facility: REHABILITATION | Age: 49
End: 2024-02-16
Payer: COMMERCIAL

## 2024-02-22 ENCOUNTER — APPOINTMENT (OUTPATIENT)
Dept: PHYSICAL THERAPY | Facility: REHABILITATION | Age: 49
End: 2024-02-22
Payer: COMMERCIAL

## 2024-02-29 ENCOUNTER — APPOINTMENT (OUTPATIENT)
Dept: PHYSICAL THERAPY | Facility: REHABILITATION | Age: 49
End: 2024-02-29
Payer: COMMERCIAL

## 2024-03-06 ENCOUNTER — APPOINTMENT (OUTPATIENT)
Dept: PHYSICAL THERAPY | Facility: REHABILITATION | Age: 49
End: 2024-03-06
Payer: COMMERCIAL

## 2024-03-11 ENCOUNTER — APPOINTMENT (OUTPATIENT)
Dept: PHYSICAL THERAPY | Facility: REHABILITATION | Age: 49
End: 2024-03-11
Payer: COMMERCIAL

## 2024-03-21 ENCOUNTER — APPOINTMENT (OUTPATIENT)
Dept: PHYSICAL THERAPY | Facility: REHABILITATION | Age: 49
End: 2024-03-21
Payer: COMMERCIAL

## 2024-03-27 ENCOUNTER — APPOINTMENT (OUTPATIENT)
Dept: PHYSICAL THERAPY | Facility: REHABILITATION | Age: 49
End: 2024-03-27
Payer: COMMERCIAL

## 2024-04-01 ENCOUNTER — TELEPHONE (OUTPATIENT)
Dept: PHYSICAL THERAPY | Facility: REHABILITATION | Age: 49
End: 2024-04-01
Payer: COMMERCIAL

## 2024-04-01 NOTE — OP THERAPY DISCHARGE SUMMARY
Outpatient Physical Therapy  DISCHARGE SUMMARY NOTE      [facility] Therapy [address]  [address]  [place] [address]  Phone:  [phone]  Fax:  [phone]    Date of Visit: 04/01/2024    Patient: Avani Young  YOB: 1975  MRN: [id number]     Referring Provider: KARLA Washburn  [address] [place] [address] [address]  [place] [address]   Referring Diagnosis: BPPV (benign paroxysmal positional vertigo), left [H81.12]         Functional Assessment Used        Your patient is being discharged from Physical Therapy with the following comments:   At last appointment 2/8 symptoms had resolved. PT planned to leave chart open x 30 days if symptoms recurred. No current appointments scheduled therefore will DC.     Preethi Leger, PT, DPT    Date: 4/1/2024

## 2024-04-16 ENCOUNTER — HOSPITAL ENCOUNTER (OUTPATIENT)
Dept: LAB | Facility: MEDICAL CENTER | Age: 49
End: 2024-04-16
Payer: COMMERCIAL

## 2024-04-16 ENCOUNTER — OFFICE VISIT (OUTPATIENT)
Dept: MEDICAL GROUP | Facility: PHYSICIAN GROUP | Age: 49
End: 2024-04-16
Payer: COMMERCIAL

## 2024-04-16 VITALS
HEIGHT: 64 IN | OXYGEN SATURATION: 99 % | RESPIRATION RATE: 16 BRPM | TEMPERATURE: 99.2 F | WEIGHT: 144.4 LBS | SYSTOLIC BLOOD PRESSURE: 114 MMHG | HEART RATE: 67 BPM | BODY MASS INDEX: 24.65 KG/M2 | DIASTOLIC BLOOD PRESSURE: 80 MMHG

## 2024-04-16 DIAGNOSIS — R73.09 ELEVATED GLYCOHEMOGLOBIN: ICD-10-CM

## 2024-04-16 DIAGNOSIS — B00.9 HSV-2 (HERPES SIMPLEX VIRUS 2) INFECTION: ICD-10-CM

## 2024-04-16 DIAGNOSIS — L29.9 ITCHING: ICD-10-CM

## 2024-04-16 DIAGNOSIS — D21.9 FIBROID: ICD-10-CM

## 2024-04-16 DIAGNOSIS — R63.1 INCREASED THIRST: ICD-10-CM

## 2024-04-16 DIAGNOSIS — R42 DIZZINESS: ICD-10-CM

## 2024-04-16 DIAGNOSIS — N92.6 IRREGULAR MENSTRUAL CYCLE: ICD-10-CM

## 2024-04-16 PROBLEM — M54.16 LUMBAR RADICULOPATHY: Status: ACTIVE | Noted: 2024-02-15

## 2024-04-16 LAB
ALBUMIN SERPL BCP-MCNC: 4.4 G/DL (ref 3.2–4.9)
ALBUMIN/GLOB SERPL: 1.6 G/DL
ALP SERPL-CCNC: 52 U/L (ref 30–99)
ALT SERPL-CCNC: 18 U/L (ref 2–50)
ANION GAP SERPL CALC-SCNC: 12 MMOL/L (ref 7–16)
AST SERPL-CCNC: 24 U/L (ref 12–45)
BASOPHILS # BLD AUTO: 1 % (ref 0–1.8)
BASOPHILS # BLD: 0.07 K/UL (ref 0–0.12)
BILIRUB SERPL-MCNC: 0.5 MG/DL (ref 0.1–1.5)
BUN SERPL-MCNC: 14 MG/DL (ref 8–22)
CALCIUM ALBUM COR SERPL-MCNC: 8.9 MG/DL (ref 8.5–10.5)
CALCIUM SERPL-MCNC: 9.2 MG/DL (ref 8.5–10.5)
CHLORIDE SERPL-SCNC: 103 MMOL/L (ref 96–112)
CO2 SERPL-SCNC: 23 MMOL/L (ref 20–33)
CREAT SERPL-MCNC: 0.81 MG/DL (ref 0.5–1.4)
EOSINOPHIL # BLD AUTO: 0.2 K/UL (ref 0–0.51)
EOSINOPHIL NFR BLD: 2.9 % (ref 0–6.9)
ERYTHROCYTE [DISTWIDTH] IN BLOOD BY AUTOMATED COUNT: 48.9 FL (ref 35.9–50)
EST. AVERAGE GLUCOSE BLD GHB EST-MCNC: 105 MG/DL
GFR SERPLBLD CREATININE-BSD FMLA CKD-EPI: 88 ML/MIN/1.73 M 2
GLOBULIN SER CALC-MCNC: 2.8 G/DL (ref 1.9–3.5)
GLUCOSE SERPL-MCNC: 94 MG/DL (ref 65–99)
HBA1C MFR BLD: 5.3 % (ref 4–5.6)
HCT VFR BLD AUTO: 41.7 % (ref 37–47)
HGB BLD-MCNC: 13.6 G/DL (ref 12–16)
IMM GRANULOCYTES # BLD AUTO: 0.02 K/UL (ref 0–0.11)
IMM GRANULOCYTES NFR BLD AUTO: 0.3 % (ref 0–0.9)
LYMPHOCYTES # BLD AUTO: 1.49 K/UL (ref 1–4.8)
LYMPHOCYTES NFR BLD: 21.9 % (ref 22–41)
MCH RBC QN AUTO: 29.3 PG (ref 27–33)
MCHC RBC AUTO-ENTMCNC: 32.6 G/DL (ref 32.2–35.5)
MCV RBC AUTO: 89.9 FL (ref 81.4–97.8)
MONOCYTES # BLD AUTO: 0.5 K/UL (ref 0–0.85)
MONOCYTES NFR BLD AUTO: 7.4 % (ref 0–13.4)
NEUTROPHILS # BLD AUTO: 4.51 K/UL (ref 1.82–7.42)
NEUTROPHILS NFR BLD: 66.5 % (ref 44–72)
NRBC # BLD AUTO: 0 K/UL
NRBC BLD-RTO: 0 /100 WBC (ref 0–0.2)
PLATELET # BLD AUTO: 226 K/UL (ref 164–446)
PMV BLD AUTO: 11.9 FL (ref 9–12.9)
POTASSIUM SERPL-SCNC: 4.4 MMOL/L (ref 3.6–5.5)
PROT SERPL-MCNC: 7.2 G/DL (ref 6–8.2)
RBC # BLD AUTO: 4.64 M/UL (ref 4.2–5.4)
SODIUM SERPL-SCNC: 138 MMOL/L (ref 135–145)
WBC # BLD AUTO: 6.8 K/UL (ref 4.8–10.8)

## 2024-04-16 PROCEDURE — 3074F SYST BP LT 130 MM HG: CPT

## 2024-04-16 PROCEDURE — 36415 COLL VENOUS BLD VENIPUNCTURE: CPT

## 2024-04-16 PROCEDURE — 85025 COMPLETE CBC W/AUTO DIFF WBC: CPT

## 2024-04-16 PROCEDURE — 80053 COMPREHEN METABOLIC PANEL: CPT

## 2024-04-16 PROCEDURE — 86695 HERPES SIMPLEX TYPE 1 TEST: CPT

## 2024-04-16 PROCEDURE — 99214 OFFICE O/P EST MOD 30 MIN: CPT

## 2024-04-16 PROCEDURE — 86694 HERPES SIMPLEX NES ANTBDY: CPT

## 2024-04-16 PROCEDURE — 86696 HERPES SIMPLEX TYPE 2 TEST: CPT

## 2024-04-16 PROCEDURE — 3079F DIAST BP 80-89 MM HG: CPT

## 2024-04-16 PROCEDURE — 83036 HEMOGLOBIN GLYCOSYLATED A1C: CPT

## 2024-04-16 RX ORDER — VALACYCLOVIR HYDROCHLORIDE 500 MG/1
500 TABLET, FILM COATED ORAL DAILY
Qty: 90 TABLET | Refills: 3 | Status: SHIPPED | OUTPATIENT
Start: 2024-04-16

## 2024-04-16 ASSESSMENT — FIBROSIS 4 INDEX: FIB4 SCORE: 1.18

## 2024-04-16 ASSESSMENT — ENCOUNTER SYMPTOMS: POLYDIPSIA: 1

## 2024-04-16 NOTE — ASSESSMENT & PLAN NOTE
Chronic, ongoing. Has upcoming follow up scheduled with GYN. Requesting repeat US for surveillance   4/26/2023 MRI: A single 3 cm intramural/subserosal leiomyoma in the anterior uterine body

## 2024-04-16 NOTE — ASSESSMENT & PLAN NOTE
Chronic, ongoing. Has lesion to left nare and genital rash for which she gets outbreak 1-2 times monthly. Reports she was never tested for this and requesting testing  Lesions are very typical of herpes lesions- with onset upon increased stress/illness/around menses.   Discussed using prophylactic approach to treatment, she would like to try this. Will start valacyclovir 500 mg daily.

## 2024-04-16 NOTE — ASSESSMENT & PLAN NOTE
Chronic, unstable. Reports drinking plenty of water, adding electrolytes. Is exercising regularly. Feels increase in thirst.

## 2024-04-16 NOTE — ASSESSMENT & PLAN NOTE
Chronic, ongoing for the past couple months. Reports pruritus in the absence of rash, dryness. Itching to head/neck, body.   Has been using lotion, allergy medications- which helped relieve symptoms temporarily. Using body wipes/vaginal wipes for itching during the day with some relief.    Increased sweating.  Recommend daily 2nd gen antihistamine

## 2024-04-16 NOTE — ASSESSMENT & PLAN NOTE
Chronic, improved. Reports symptoms alleviated with PT. Continues to do exercises as needed/instructed.

## 2024-04-16 NOTE — PROGRESS NOTES
"Subjective:     CC: Diagnoses of HSV-2 (herpes simplex virus 2) infection, Fibroid, Irregular menstrual cycle, Increased thirst, Itching, Dizziness, and Elevated glycohemoglobin were pertinent to this visit.    Pt presents today requesting testing for subjective increased thirst, intense itching in the absence of rash, recurrent hsv lesions- requesting refill of antiviral for this, and surveillance of leiomyoma.      HPI:   Avani presents today with    Problem   Increased Thirst   Itching   Lumbar Radiculopathy   Dizziness   Fibroid    Transvaginal ultrasound shows 2.5 cm fibroid.  This was done after concerns  about her menstrual cycle becoming irregular and occurring further apart.  She also states her flow is much heavier.  Her mother has a history of fibroids and so she was concerned she had fibroids.     Hsv-2 (Herpes Simplex Virus 2) Infection     ROS:  Review of Systems   Skin:  Positive for itching.   Endo/Heme/Allergies:  Positive for polydipsia.   All other systems reviewed and are negative.      Objective:     Exam:  /80 (BP Location: Right arm, Patient Position: Sitting, BP Cuff Size: Adult)   Pulse 67   Temp 37.3 °C (99.2 °F) (Temporal)   Resp 16   Ht 1.626 m (5' 4\")   Wt 65.5 kg (144 lb 6.4 oz)   SpO2 99%   BMI 24.79 kg/m²  Body mass index is 24.79 kg/m².    Physical Exam  Vitals reviewed.   Constitutional:       General: She is not in acute distress.     Appearance: Normal appearance. She is not ill-appearing.   HENT:      Head: Normocephalic and atraumatic.   Cardiovascular:      Rate and Rhythm: Normal rate.      Pulses: Normal pulses.   Pulmonary:      Effort: Pulmonary effort is normal. No respiratory distress.   Skin:     General: Skin is warm and dry.      Findings: No erythema or rash.   Neurological:      General: No focal deficit present.      Mental Status: She is alert and oriented to person, place, and time.   Psychiatric:         Mood and Affect: Mood normal.         " Behavior: Behavior normal.         Assessment & Plan:     48 y.o. female with the following -     Problem List Items Addressed This Visit       HSV-2 (herpes simplex virus 2) infection     Chronic, ongoing. Has lesion to left nare and genital rash for which she gets outbreak 1-2 times monthly. Reports she was never tested for this and requesting testing  Lesions are very typical of herpes lesions- with onset upon increased stress/illness/around menses.   Discussed using prophylactic approach to treatment, she would like to try this. Will start valacyclovir 500 mg daily.           Relevant Medications    valACYclovir (VALTREX) 500 MG Tab    Other Relevant Orders    HSV 1/2 IGG W/ TYPE SPECIFIC RFLX    Irregular menstrual cycle    Relevant Orders    US-PELVIC COMPLETE (TRANSABDOMINAL/TRANSVAGINAL) (COMBO)    Fibroid     Chronic, ongoing. Has upcoming follow up scheduled with GYN. Requesting repeat US for surveillance   4/26/2023 MRI: A single 3 cm intramural/subserosal leiomyoma in the anterior uterine body          Relevant Orders    US-PELVIC COMPLETE (TRANSABDOMINAL/TRANSVAGINAL) (COMBO)    Dizziness     Chronic, improved. Reports symptoms alleviated with PT. Continues to do exercises as needed/instructed.         Increased thirst     Chronic, unstable. Reports drinking plenty of water, adding electrolytes. Is exercising regularly. Feels increase in thirst.         Relevant Orders    HEMOGLOBIN A1C    Comp Metabolic Panel    Itching     Chronic, ongoing for the past couple months. Reports pruritus in the absence of rash, dryness. Itching to head/neck, body.   Has been using lotion, allergy medications- which helped relieve symptoms temporarily. Using body wipes/vaginal wipes for itching during the day with some relief.    Increased sweating.  Recommend daily 2nd gen antihistamine           Relevant Orders    CBC WITH DIFFERENTIAL     Other Visit Diagnoses       Elevated glycohemoglobin        Relevant Orders     HEMOGLOBIN A1C          Patient was educated in proper administration of medication(s) ordered today including safety, possible SE, risks, benefits, rationale and alternatives to therapy.   Supportive care, differential diagnoses, and indications for immediate follow-up discussed with patient.    Pathogenesis of diagnosis discussed including typical length and natural progression.    Instructed to return to clinic or nearest emergency department for any change in condition, further concerns, or worsening of symptoms.  Patient states understanding of the plan of care and discharge instructions.    Return in about 4 weeks (around 5/14/2024), or if symptoms worsen or fail to improve.    Please note that this dictation was created using voice recognition software. I have made every reasonable attempt to correct obvious errors, but I expect that there are errors of grammar and possibly content that I did not discover before finalizing the note.

## 2024-04-18 LAB
HSV1 GG IGG SER-ACNC: 44.5 IV
HSV1+2 IGG SER IA-ACNC: >22.4 IV
HSV2 GG IGG SER-ACNC: 0.89 IV

## 2024-05-08 ENCOUNTER — OFFICE VISIT (OUTPATIENT)
Dept: OBGYN | Facility: CLINIC | Age: 49
End: 2024-05-08
Payer: COMMERCIAL

## 2024-05-08 ENCOUNTER — HOSPITAL ENCOUNTER (OUTPATIENT)
Facility: MEDICAL CENTER | Age: 49
End: 2024-05-08
Attending: PHYSICIAN ASSISTANT
Payer: COMMERCIAL

## 2024-05-08 VITALS — WEIGHT: 147 LBS | BODY MASS INDEX: 25.23 KG/M2 | SYSTOLIC BLOOD PRESSURE: 131 MMHG | DIASTOLIC BLOOD PRESSURE: 80 MMHG

## 2024-05-08 DIAGNOSIS — D21.9 FIBROID: ICD-10-CM

## 2024-05-08 DIAGNOSIS — N95.2 VAGINAL ATROPHY: ICD-10-CM

## 2024-05-08 DIAGNOSIS — N89.8 VAGINA ITCHING: ICD-10-CM

## 2024-05-08 DIAGNOSIS — Z01.419 WELL WOMAN EXAM: ICD-10-CM

## 2024-05-08 PROCEDURE — 3075F SYST BP GE 130 - 139MM HG: CPT | Performed by: PHYSICIAN ASSISTANT

## 2024-05-08 PROCEDURE — 3079F DIAST BP 80-89 MM HG: CPT | Performed by: PHYSICIAN ASSISTANT

## 2024-05-08 PROCEDURE — 99459 PELVIC EXAMINATION: CPT | Performed by: PHYSICIAN ASSISTANT

## 2024-05-08 PROCEDURE — 99386 PREV VISIT NEW AGE 40-64: CPT | Performed by: PHYSICIAN ASSISTANT

## 2024-05-08 RX ORDER — ESTRADIOL 0.1 MG/G
CREAM VAGINAL
Qty: 42.5 G | Refills: 3 | Status: SHIPPED | OUTPATIENT
Start: 2024-05-08

## 2024-05-08 ASSESSMENT — FIBROSIS 4 INDEX: FIB4 SCORE: 1.25

## 2024-05-08 NOTE — PROGRESS NOTES
Pt here for annual exam  Pt states that she has vaginal itching x 2 months  Pharmacy verified   Good # [phone]  Pap:01/18/2023 Negative  Mammo:01/31/2024  Benign  Lmp:04/18/2024  Bc:None/BTL 2022

## 2024-05-08 NOTE — PROGRESS NOTES
ANNUAL GYNECOLOGY VISIT    Chief Complaint  Annual Exam    Subjective  Avani Young is a 48 y.o. female  Patient's last menstrual period was 2024 (approximate) on nothing for contraception who presents today for Annual Exam..    Perimenopause  Pt reports changes in menses over last few years with cycles now q21d down from q26d with PMS sx between cycles, heavier bleeding and more cramping. Admits to night sweats. No hot flashes or mood changes.  Does report vaginal dryness and itching x 2-3 months. Using special vaginal soap that helps. Admits to vaginal odor 1 week ago. No vaginal discharge.  Pt has hx of 2.7cm fibroid found of US  2022. Has US appt to recheck.    Preventive Care   Immunization History   Administered Date(s) Administered    Tdap Vaccine 2022     Last Pap: 23 neg cotest  Any abnormal pap smears?  no  Last Mammogram: 2024 negative   Last Colonoscopy: Cologuard neg 10/1123  Last DEXA: n/a      Gynecology History  Current Sexual Activity: yes - monogamaous male partner   History of sexually transmitted diseases? yes - HSV, controlled with Valtrex daily  Abnormal discharge? no  Menopause: no  Postmenopausal bleeding: n/a    Menstrual History  Patient's last menstrual period was 2024 (approximate).  Periods are regular every 21 days, lasting 4 days. Has shortened recently.   Dysmenorrhea :none.   No intermenstrual bleeding, spotting, or discharge.  PMS symptoms?  no    Contraception  Current: none  Past: OCP, condoms, IUD      Cancer Risk Assessement:  Family history of:   - Breast cancer: no   - Ovarian cancer: no   - Uterine cancer: no   - Colon cancer: no    Obstetric History  OB History    Para Term  AB Living   3         3   SAB IAB Ectopic Molar Multiple Live Births                    # Outcome Date GA Lbr Andrew/2nd Weight Sex Delivery Anes PTL Lv   3             2             1                 Past Medical History  Past  Medical History:   Diagnosis Date    Elevated LFTs 12/3/2020       Past Surgical History  Past Surgical History:   Procedure Laterality Date    DENTAL EXTRACTION(S)  1993    wisdom teeth extraction       Social History  Social History     Tobacco Use    Smoking status: Never    Smokeless tobacco: Never   Vaping Use    Vaping Use: Never used   Substance Use Topics    Alcohol use: Never    Drug use: Never        Family History  Family History   Problem Relation Age of Onset    Cancer Mother         Lung Cancer; cervical    Lung Disease Mother     Diabetes Father     Heart Disease Father     Stroke Maternal Grandmother         TIA    Heart Disease Paternal Grandfather     Diabetes Paternal Grandfather     Breast Cancer Neg Hx     Colorectal Cancer Neg Hx     Peritoneal Cancer Neg Hx     Tubal Cancer Neg Hx     Ovarian Cancer Neg Hx     Hypertension Neg Hx     Hyperlipidemia Neg Hx        Home Medications  Current Outpatient Medications   Medication Sig    valACYclovir (VALTREX) 500 MG Tab Take 1 Tablet by mouth every day.       Allergies/Reactions  Allergies   Allergen Reactions    Prochlorperazine Unspecified     Jaw locked up      Penicillins Unspecified     Childhood         ROS  Review of Systems:  Gen: no fevers or chills, no significant weight loss or gain  Respiratory:  no cough or dyspnea  Cardiac:  no chest pain, no palpitations, no syncope  GI:  no heartburn, no abdominal pain, no nausea or vomiting  Psych: no depression or anxiety    Objective  LMP 04/18/2024 (Approximate)     Constitutional: The patient is well developed and well nourished.  Psychiatric: Patient is oriented to time place and person.   Skin: No rash observed.  Neck: Appears symmetric. Thyroid normal size  Respiratory: normal effort  Breast: Inspection reveals no asymetry or nipple discharge, no skin thickening, dimpling or erythema.  Palpation demonstrates no masses.  Abdomen: Soft, non-tender.  Pelvic Exam:      Vulva: external female  genitalia are normal in appearance. No lesions     Urethra - no lesions, no erythema     Vagina: moist, pale, slightly decreased ruggae     Cervix: pink, smooth, no lesions, no CMT     Uterus - non-tender, normal size, shape, contour, mobile, anteverted     Ovaries: non-tender, no appreciable masses   Pap Smear performed: No   Chaperone Present: DARIO Auguste   Extremities: Legs are symmetric and without tenderness. There is no edema present.      Assessment & Plan  Avani Young is a 48 y.o. female who presents today for Annual Gyn Exam.     1. Health Maintenance   PAP: UTD  STI Screen (HIV, Syphilis, Chlamydia / Gonorrhea): declines   MAMMOGRAM: UTD  COLONOSCOPY: UTD  DEXA: n/a  IMMUNIZATIONS: UTD  TOBACCO: never  DIABETES SCREEN: monitored by PCP  CHOLESTEROL SCREEN: monitored by PCP  COUNSELING: breast self exam, mammography screening, use and side effects of HRT, and menopause      2. Vagina itching    - VAGINAL PATHOGENS DNA PANEL; to r/o infectious cause of sx.     3. Vaginal atrophy    -Pt likely perimenopausal. Discussed average age of menopause, common perimenopausal symptoms, expected course, and when to seek treatment if desired.   - Discussed physiology of menopause leading to vaginal atrophy. Recommend OTC vaginal moisturizer such as Replens. Rx for trial of estrogen cream.   - estradiol (ESTRACE VAGINAL) 0.1 MG/GM vaginal cream; Apply 1g cream inside vagina using applicator nightly for 2 weeks, then twice per week thereafter  Dispense: 42.5 g; Refill: 3    4. Fibroid    - Pelvic US as scheduled. If fibroid has greatly increased in size or menstrual changes become bothersome we do try a Mirena IUD vs surgical management of symptoms.       Return: Annually or PRN    Jennyfer Pastor P.A.-C.  [facility]   5/8/2024

## 2024-05-09 LAB
CANDIDA DNA VAG QL PROBE+SIG AMP: NEGATIVE
G VAGINALIS DNA VAG QL PROBE+SIG AMP: NEGATIVE
T VAGINALIS DNA VAG QL PROBE+SIG AMP: NEGATIVE

## 2024-05-16 ENCOUNTER — APPOINTMENT (OUTPATIENT)
Dept: RADIOLOGY | Facility: MEDICAL CENTER | Age: 49
End: 2024-05-16
Payer: COMMERCIAL

## 2024-06-04 ENCOUNTER — HOSPITAL ENCOUNTER (OUTPATIENT)
Dept: RADIOLOGY | Facility: MEDICAL CENTER | Age: 49
End: 2024-06-04
Payer: COMMERCIAL

## 2024-06-04 ENCOUNTER — TELEPHONE (OUTPATIENT)
Dept: OBGYN | Facility: CLINIC | Age: 49
End: 2024-06-04

## 2024-06-04 DIAGNOSIS — N92.6 IRREGULAR MENSTRUAL CYCLE: ICD-10-CM

## 2024-06-04 DIAGNOSIS — D21.9 FIBROID: ICD-10-CM

## 2024-06-04 PROCEDURE — 76830 TRANSVAGINAL US NON-OB: CPT

## 2024-06-04 NOTE — TELEPHONE ENCOUNTER
----- Message from Jennyfer Pastor P.A.-C. sent at 6/4/2024  2:08 PM PDT -----  Please call pt-   US shows slightly increase in size of fibroid plus now a second fibroid. We discussed at her visit IUD for bleed control vs surgical management of fibroids. Please schedule pt with a physician to review US in more detail and make a plan.    Jennyfer Pastor P.A.-C.    6/4/2024   Called pt and informed as above. Schedule pt with Dr Babin on 6/10/2024 at 1045. Pt agreed and verbalized understanding.

## 2024-06-10 ENCOUNTER — OFFICE VISIT (OUTPATIENT)
Dept: OBGYN | Facility: CLINIC | Age: 49
End: 2024-06-10
Payer: COMMERCIAL

## 2024-06-10 VITALS — DIASTOLIC BLOOD PRESSURE: 82 MMHG | WEIGHT: 147.8 LBS | BODY MASS INDEX: 25.37 KG/M2 | SYSTOLIC BLOOD PRESSURE: 138 MMHG

## 2024-06-10 DIAGNOSIS — D25.9 UTERINE LEIOMYOMA, UNSPECIFIED LOCATION: ICD-10-CM

## 2024-06-10 DIAGNOSIS — N95.1 PERIMENOPAUSE: ICD-10-CM

## 2024-06-10 DIAGNOSIS — R23.2 HOT FLASHES: ICD-10-CM

## 2024-06-10 PROCEDURE — 99214 OFFICE O/P EST MOD 30 MIN: CPT | Performed by: OBSTETRICS & GYNECOLOGY

## 2024-06-10 PROCEDURE — 3075F SYST BP GE 130 - 139MM HG: CPT | Performed by: OBSTETRICS & GYNECOLOGY

## 2024-06-10 PROCEDURE — 3079F DIAST BP 80-89 MM HG: CPT | Performed by: OBSTETRICS & GYNECOLOGY

## 2024-06-10 ASSESSMENT — ENCOUNTER SYMPTOMS
CARDIOVASCULAR NEGATIVE: 1
NEUROLOGICAL NEGATIVE: 1
MUSCULOSKELETAL NEGATIVE: 1
RESPIRATORY NEGATIVE: 1
EYES NEGATIVE: 1
PSYCHIATRIC NEGATIVE: 1
GASTROINTESTINAL NEGATIVE: 1
CONSTITUTIONAL NEGATIVE: 1

## 2024-06-10 ASSESSMENT — FIBROSIS 4 INDEX: FIB4 SCORE: 1.25

## 2024-06-10 NOTE — PROGRESS NOTES
Patient here for GYN visit.   Last seen on : 05/08/2024  LMP : 06/09/2024  BCM : none   Pap : 01/18/2023 WNL  Mammo : 02/01/24 benign   Pt states would like to discuss more in detail results  Phone/Pharmacy verified:

## 2024-06-10 NOTE — PROGRESS NOTES
GYN PROBLEM VISIT    CC:  Results (ultrasound)    HPI: Patient is a 48 y.o.  who presents to follow up on her ultrasound. She is premenopausal. Her ultrasound was performed right before she had her last period which may explain the endometrial mass that was noted. She denies any abnormal bleeding.She notes some pelvic pressure only when she's on her cycle. She is not having any significant pelvic pain. We reviewed her ultrasound images together and discussed that her fibroids are small and not growing rapidly and if she is not having symptoms we don't need to repeat the ultrasound. I recommended that she return in one year for an annual and reevaluation.     She is experiencing hot flashes that have been getting worse. She has been prescribed vaginal estrogen for vaginal dryness, but would like to avoid all hormones. We discussed vaginal moisturizers as another option. We discussed Veozah as a non-hormonal option for hot flashes. Recent CMP was reviewed an normal. She is interested in this and given samples a brochure today.      ROS:   Review of Systems   Constitutional: Negative.    HENT: Negative.     Eyes: Negative.    Respiratory: Negative.     Cardiovascular: Negative.    Gastrointestinal: Negative.    Genitourinary: Negative.    Musculoskeletal: Negative.    Skin: Negative.    Neurological: Negative.    Endo/Heme/Allergies: Negative.    Psychiatric/Behavioral: Negative.           PFSH:  I personally reviewed the past medical and surgical histories.     Social History     Tobacco Use    Smoking status: Never    Smokeless tobacco: Never   Vaping Use    Vaping status: Never Used   Substance Use Topics    Alcohol use: Never    Drug use: Never       Social History     Substance and Sexual Activity   Sexual Activity Yes    Partners: Male    Birth control/protection: Female Sterilization    Comment: 2002 BTL        ALLERGIES / REACTIONS:  Allergies   Allergen Reactions    Prochlorperazine Unspecified     Jaw  locked up      Penicillins Unspecified     Childhood                             PHYSICAL EXAMINATION:  Vital Signs:   Vitals:    06/10/24 1049   BP: 138/82   BP Location: Left arm   Patient Position: Sitting   BP Cuff Size: Adult   Weight: 147 lb 12.8 oz     Body mass index is 25.37 kg/m².    Physical Exam  Vitals reviewed.   Constitutional:       Appearance: Normal appearance.   HENT:      Head: Normocephalic.   Eyes:      Extraocular Movements: Extraocular movements intact.      Pupils: Pupils are equal, round, and reactive to light.   Cardiovascular:      Rate and Rhythm: Normal rate.   Pulmonary:      Effort: Pulmonary effort is normal.   Abdominal:      General: Abdomen is flat.      Palpations: Abdomen is soft.   Musculoskeletal:         General: Normal range of motion.      Cervical back: Normal range of motion.   Skin:     General: Skin is warm and dry.   Neurological:      General: No focal deficit present.      Mental Status: She is alert and oriented to person, place, and time.   Psychiatric:         Mood and Affect: Mood normal.         Behavior: Behavior normal.          ASSESSMENT AND PLAN:  48 y.o.    1. Uterine leiomyoma, unspecified location    2. Hot flashes  - Fezolinetant 45 MG Tab; Take 1 Tablet by mouth every day.  Dispense: 30 Tablet; Refill: 3    3. Perimenopause  - Fezolinetant 45 MG Tab; Take 1 Tablet by mouth every day.  Dispense: 30 Tablet; Refill: 3    Plan:  Follow up in one year for annual exam  Counseling regarding perimenopause and menopause was provided  She is experiencing vaginal dryness and hot flashes and does not want to use hormones. A vaginal moisturizer was recommended and she was prescribed Veozah as a non-hormonal method for management of hot flashes  RTC for annual exam    At least 35 minutes were spent on chart review, reviewing her ultrasound images, counseling her, and arranging follow up care     Sho Babin M.D.  Obstetrics & Gynecology    [id number]  2:50 PM

## 2024-07-31 ENCOUNTER — APPOINTMENT (OUTPATIENT)
Dept: MEDICAL GROUP | Facility: PHYSICIAN GROUP | Age: 49
End: 2024-07-31
Payer: COMMERCIAL

## 2024-12-18 ENCOUNTER — APPOINTMENT (OUTPATIENT)
Dept: MEDICAL GROUP | Facility: PHYSICIAN GROUP | Age: 49
End: 2024-12-18
Payer: COMMERCIAL

## 2024-12-18 VITALS
SYSTOLIC BLOOD PRESSURE: 128 MMHG | RESPIRATION RATE: 20 BRPM | BODY MASS INDEX: 26.46 KG/M2 | DIASTOLIC BLOOD PRESSURE: 82 MMHG | HEIGHT: 64 IN | HEART RATE: 71 BPM | TEMPERATURE: 97.8 F | WEIGHT: 155 LBS

## 2024-12-18 DIAGNOSIS — B00.9 HSV-2 (HERPES SIMPLEX VIRUS 2) INFECTION: ICD-10-CM

## 2024-12-18 DIAGNOSIS — Z12.31 ENCOUNTER FOR SCREENING MAMMOGRAM FOR MALIGNANT NEOPLASM OF BREAST: ICD-10-CM

## 2024-12-18 DIAGNOSIS — Z87.828 HISTORY OF HEAD INJURY: ICD-10-CM

## 2024-12-18 DIAGNOSIS — R42 DIZZINESS: ICD-10-CM

## 2024-12-18 DIAGNOSIS — M21.619 BUNION: ICD-10-CM

## 2024-12-18 DIAGNOSIS — R41.3 MEMORY CHANGES: ICD-10-CM

## 2024-12-18 PROBLEM — R63.1 INCREASED THIRST: Status: RESOLVED | Noted: 2024-04-16 | Resolved: 2024-12-18

## 2024-12-18 PROCEDURE — 99214 OFFICE O/P EST MOD 30 MIN: CPT | Mod: 25

## 2024-12-18 PROCEDURE — 99396 PREV VISIT EST AGE 40-64: CPT

## 2024-12-18 RX ORDER — VITAMIN B COMPLEX
1000 TABLET ORAL DAILY
COMMUNITY

## 2024-12-18 SDOH — ECONOMIC STABILITY: INCOME INSECURITY: HOW HARD IS IT FOR YOU TO PAY FOR THE VERY BASICS LIKE FOOD, HOUSING, MEDICAL CARE, AND HEATING?: NOT HARD AT ALL

## 2024-12-18 SDOH — ECONOMIC STABILITY: TRANSPORTATION INSECURITY
IN THE PAST 12 MONTHS, HAS LACK OF TRANSPORTATION KEPT YOU FROM MEETINGS, WORK, OR FROM GETTING THINGS NEEDED FOR DAILY LIVING?: NO

## 2024-12-18 SDOH — ECONOMIC STABILITY: INCOME INSECURITY: IN THE LAST 12 MONTHS, WAS THERE A TIME WHEN YOU WERE NOT ABLE TO PAY THE MORTGAGE OR RENT ON TIME?: NO

## 2024-12-18 SDOH — ECONOMIC STABILITY: TRANSPORTATION INSECURITY
IN THE PAST 12 MONTHS, HAS THE LACK OF TRANSPORTATION KEPT YOU FROM MEDICAL APPOINTMENTS OR FROM GETTING MEDICATIONS?: NO

## 2024-12-18 SDOH — ECONOMIC STABILITY: TRANSPORTATION INSECURITY
IN THE PAST 12 MONTHS, HAS LACK OF RELIABLE TRANSPORTATION KEPT YOU FROM MEDICAL APPOINTMENTS, MEETINGS, WORK OR FROM GETTING THINGS NEEDED FOR DAILY LIVING?: NO

## 2024-12-18 SDOH — ECONOMIC STABILITY: HOUSING INSECURITY
IN THE LAST 12 MONTHS, WAS THERE A TIME WHEN YOU DID NOT HAVE A STEADY PLACE TO SLEEP OR SLEPT IN A SHELTER (INCLUDING NOW)?: NO

## 2024-12-18 SDOH — ECONOMIC STABILITY: FOOD INSECURITY: WITHIN THE PAST 12 MONTHS, YOU WORRIED THAT YOUR FOOD WOULD RUN OUT BEFORE YOU GOT MONEY TO BUY MORE.: NEVER TRUE

## 2024-12-18 SDOH — HEALTH STABILITY: PHYSICAL HEALTH: ON AVERAGE, HOW MANY DAYS PER WEEK DO YOU ENGAGE IN MODERATE TO STRENUOUS EXERCISE (LIKE A BRISK WALK)?: 5 DAYS

## 2024-12-18 SDOH — ECONOMIC STABILITY: FOOD INSECURITY: WITHIN THE PAST 12 MONTHS, THE FOOD YOU BOUGHT JUST DIDN'T LAST AND YOU DIDN'T HAVE MONEY TO GET MORE.: NEVER TRUE

## 2024-12-18 SDOH — HEALTH STABILITY: PHYSICAL HEALTH: ON AVERAGE, HOW MANY MINUTES DO YOU ENGAGE IN EXERCISE AT THIS LEVEL?: 10 MIN

## 2024-12-18 SDOH — HEALTH STABILITY: MENTAL HEALTH
STRESS IS WHEN SOMEONE FEELS TENSE, NERVOUS, ANXIOUS, OR CAN'T SLEEP AT NIGHT BECAUSE THEIR MIND IS TROUBLED. HOW STRESSED ARE YOU?: ONLY A LITTLE

## 2024-12-18 ASSESSMENT — SOCIAL DETERMINANTS OF HEALTH (SDOH)
HOW OFTEN DO YOU HAVE SIX OR MORE DRINKS ON ONE OCCASION: NEVER
DO YOU BELONG TO ANY CLUBS OR ORGANIZATIONS SUCH AS CHURCH GROUPS UNIONS, FRATERNAL OR ATHLETIC GROUPS, OR SCHOOL GROUPS?: YES
WITHIN THE PAST 12 MONTHS, YOU WORRIED THAT YOUR FOOD WOULD RUN OUT BEFORE YOU GOT THE MONEY TO BUY MORE: NEVER TRUE
IN A TYPICAL WEEK, HOW MANY TIMES DO YOU TALK ON THE PHONE WITH FAMILY, FRIENDS, OR NEIGHBORS?: MORE THAN THREE TIMES A WEEK
IN THE PAST 12 MONTHS, HAS THE ELECTRIC, GAS, OIL, OR WATER COMPANY THREATENED TO SHUT OFF SERVICE IN YOUR HOME?: NO
HOW OFTEN DO YOU ATTENT MEETINGS OF THE CLUB OR ORGANIZATION YOU BELONG TO?: MORE THAN 4 TIMES PER YEAR
HOW MANY DRINKS CONTAINING ALCOHOL DO YOU HAVE ON A TYPICAL DAY WHEN YOU ARE DRINKING: PATIENT DOES NOT DRINK
HOW HARD IS IT FOR YOU TO PAY FOR THE VERY BASICS LIKE FOOD, HOUSING, MEDICAL CARE, AND HEATING?: NOT HARD AT ALL
HOW OFTEN DO YOU ATTEND CHURCH OR RELIGIOUS SERVICES?: MORE THAN 4 TIMES PER YEAR
HOW OFTEN DO YOU GET TOGETHER WITH FRIENDS OR RELATIVES?: MORE THAN THREE TIMES A WEEK
DO YOU BELONG TO ANY CLUBS OR ORGANIZATIONS SUCH AS CHURCH GROUPS UNIONS, FRATERNAL OR ATHLETIC GROUPS, OR SCHOOL GROUPS?: YES
HOW OFTEN DO YOU GET TOGETHER WITH FRIENDS OR RELATIVES?: MORE THAN THREE TIMES A WEEK
HOW OFTEN DO YOU ATTENT MEETINGS OF THE CLUB OR ORGANIZATION YOU BELONG TO?: MORE THAN 4 TIMES PER YEAR
IN A TYPICAL WEEK, HOW MANY TIMES DO YOU TALK ON THE PHONE WITH FAMILY, FRIENDS, OR NEIGHBORS?: MORE THAN THREE TIMES A WEEK
HOW OFTEN DO YOU ATTEND CHURCH OR RELIGIOUS SERVICES?: MORE THAN 4 TIMES PER YEAR
HOW OFTEN DO YOU HAVE A DRINK CONTAINING ALCOHOL: NEVER

## 2024-12-18 ASSESSMENT — LIFESTYLE VARIABLES
HOW OFTEN DO YOU HAVE A DRINK CONTAINING ALCOHOL: NEVER
SKIP TO QUESTIONS 9-10: 1
AUDIT-C TOTAL SCORE: 0
HOW OFTEN DO YOU HAVE SIX OR MORE DRINKS ON ONE OCCASION: NEVER
HOW MANY STANDARD DRINKS CONTAINING ALCOHOL DO YOU HAVE ON A TYPICAL DAY: PATIENT DOES NOT DRINK

## 2024-12-18 ASSESSMENT — PATIENT HEALTH QUESTIONNAIRE - PHQ9: CLINICAL INTERPRETATION OF PHQ2 SCORE: 0

## 2024-12-18 ASSESSMENT — ENCOUNTER SYMPTOMS: DIZZINESS: 1

## 2024-12-18 ASSESSMENT — FIBROSIS 4 INDEX: FIB4 SCORE: 1.28

## 2024-12-18 NOTE — PROGRESS NOTES
Verbal consent was acquired by the patient to use [employer] ambient listening note generation during this visit     Subjective:     CC: Diagnoses of HSV-2 (herpes simplex virus 2) infection, Bunion, History of head injury, Dizziness, Memory changes, and Encounter for screening mammogram for malignant neoplasm of breast were pertinent to this visit.    HPI:   Avani presents today with    History of Present Illness  The patient presents for a wellness visit.    She has discontinued the use of vaginal estrogen and all other medications, except for Valtrex, which she takes as needed. She is not currently taking Valtrex.     Health maintenance:  She maintains a balanced diet, including a variety of fresh fruits and vegetables, and avoids fast food, junk food, and soda. She exercises regularly, although less frequently since the closure of her pool. She reports no substance abuse, feels safe in her marriage, always wears her seatbelt, and uses sunscreen when outdoors. She is under the care of a gynecologist and reports normal bowel and bladder function.     She has had dental extractions and a tubal ligation but no other surgeries.     She has no concerns regarding sexually transmitted infections or increased risk or practices. She is up to date on her vaccines. She is interested in getting the shingles vaccine after the holidays. She declines influenza vaccine today.    She has been diagnosed with bunions on both feet. She is considering whether to consult a podiatrist. She typically wears wide-toed shoes and is concerned about the potential worsening of her condition. She reports that her toes are inwardly rotated, which causes discomfort when wearing regular shoes.    She continues to experience dizziness. She has a history of 4 significant head injuries, the most recent of which occurred approximately 15 to 17 years ago. Over the past 2 to 3 months, she has noticed occasional memory lapses and confusion, such as  "forgetting a close friend's last name. She recalls that following her last head injury, she was hospitalized and sedated due to aggressive behavior upon awakening. She is curious about the potential long-term effects of these injuries, particularly as 3 occurred during her childhood and the last one when she was 31 years old.    She takes vitamin D and magnesium supplements, which she finds beneficial for her joint soreness, particularly in the morning. She also uses a topical magnesium cream for muscle soreness associated with spinal stenosis.    SOCIAL HISTORY  She does not smoke. She works for [employer].    FAMILY HISTORY  Her mother had lung cancer. Her maternal grandfather had a stroke in his 60s. Her maternal grandmother had TIAs. Her father self-diagnosed himself with diabetes. Her paternal grandfather  before she was born, and she does not have any information about him.    ALLERGIES  She is allergic to COMPAZINE and PENICILLIN.    IMMUNIZATIONS  She is up to date on her vaccines. She is interested in getting the shingles vaccine after the holidays. She declines influenza vaccine today.    Current Outpatient Medications   Medication Sig Dispense Refill    vitamin D3 (CHOLECALCIFEROL) 1000 Unit (25 mcg) Tab Take 1,000 Units by mouth every day.      MAGNESIUM-POTASSIUM PO Take  by mouth.      valACYclovir (VALTREX) 500 MG Tab Take 1 Tablet by mouth every day. 90 Tablet 3     No current facility-administered medications for this visit.       Problem   Bunion   History of Head Injury   Hsv-2 (Herpes Simplex Virus 2) Infection   Increased Thirst (Resolved)       Health Maintenance: Completed    ROS:  Review of Systems   Neurological:  Positive for dizziness.   All other systems reviewed and are negative.      Objective:     Exam:  /82 (BP Location: Left arm, Patient Position: Sitting, BP Cuff Size: Adult)   Pulse 71   Temp 36.6 °C (97.8 °F) (Temporal)   Resp 20   Ht 1.626 m (5' 4\")   Wt 70.3 " kg (155 lb)   BMI 26.61 kg/m²  Body mass index is 26.61 kg/m².    Physical Exam  Vitals reviewed.   Constitutional:       General: She is not in acute distress.     Appearance: Normal appearance. She is well-groomed. She is not ill-appearing.   HENT:      Head: Normocephalic and atraumatic.      Right Ear: Tympanic membrane, ear canal and external ear normal.      Left Ear: Tympanic membrane, ear canal and external ear normal.      Nose: Nose normal.      Mouth/Throat:      Mouth: Mucous membranes are moist.      Pharynx: Oropharynx is clear.   Eyes:      Extraocular Movements: Extraocular movements intact.      Conjunctiva/sclera: Conjunctivae normal.      Pupils: Pupils are equal, round, and reactive to light.   Neck:      Thyroid: No thyromegaly.      Trachea: Trachea normal.   Cardiovascular:      Rate and Rhythm: Normal rate and regular rhythm.      Pulses: Normal pulses.      Heart sounds: Normal heart sounds. No murmur heard.  Pulmonary:      Effort: Pulmonary effort is normal. No respiratory distress.      Breath sounds: Normal breath sounds. No wheezing.   Abdominal:      General: Bowel sounds are normal.   Musculoskeletal:         General: No swelling, tenderness or deformity. Normal range of motion.      Cervical back: Full passive range of motion without pain.   Lymphadenopathy:      Cervical: No cervical adenopathy.   Skin:     General: Skin is warm and dry.      Capillary Refill: Capillary refill takes less than 2 seconds.   Neurological:      General: No focal deficit present.      Mental Status: She is alert and oriented to person, place, and time. Mental status is at baseline.      Cranial Nerves: No cranial nerve deficit.      Sensory: No sensory deficit.      Motor: No weakness.   Psychiatric:         Mood and Affect: Mood normal.         Behavior: Behavior normal.         Labs:    Latest Reference Range & Units 04/16/24 10:15 05/08/24 13:07 06/04/24 11:31   WBC 4.8 - 10.8 K/uL 6.8     RBC 4.20 -  5.40 M/uL 4.64     Hemoglobin 12.0 - 16.0 g/dL 13.6     Hematocrit 37.0 - 47.0 % 41.7     MCV 81.4 - 97.8 fL 89.9     MCH 27.0 - 33.0 pg 29.3     MCHC 32.2 - 35.5 g/dL 32.6     RDW 35.9 - 50.0 fL 48.9     Platelet Count 164 - 446 K/uL 226     MPV 9.0 - 12.9 fL 11.9     Neutrophils-Polys 44.00 - 72.00 % 66.50     Neutrophils (Absolute) 1.82 - 7.42 K/uL 4.51     Lymphocytes 22.00 - 41.00 % 21.90 (L)     Lymphs (Absolute) 1.00 - 4.80 K/uL 1.49     Monocytes 0.00 - 13.40 % 7.40     Monos (Absolute) 0.00 - 0.85 K/uL 0.50     Eosinophils 0.00 - 6.90 % 2.90     Eos (Absolute) 0.00 - 0.51 K/uL 0.20     Basophils 0.00 - 1.80 % 1.00     Baso (Absolute) 0.00 - 0.12 K/uL 0.07     Immature Granulocytes 0.00 - 0.90 % 0.30     Immature Granulocytes (abs) 0.00 - 0.11 K/uL 0.02     Nucleated RBC 0.00 - 0.20 /100 WBC 0.00     NRBC (Absolute) K/uL 0.00     Sodium 135 - 145 mmol/L 138     Potassium 3.6 - 5.5 mmol/L 4.4     Chloride 96 - 112 mmol/L 103     Co2 20 - 33 mmol/L 23     Anion Gap 7.0 - 16.0  12.0     Glucose 65 - 99 mg/dL 94     Bun 8 - 22 mg/dL 14     Creatinine 0.50 - 1.40 mg/dL 0.81     GFR (CKD-EPI) >60 mL/min/1.73 m 2 88     Calcium 8.5 - 10.5 mg/dL 9.2     Correct Calcium 8.5 - 10.5 mg/dL 8.9     AST(SGOT) 12 - 45 U/L 24     ALT(SGPT) 2 - 50 U/L 18     Alkaline Phosphatase 30 - 99 U/L 52     Total Bilirubin 0.1 - 1.5 mg/dL 0.5     Albumin 3.2 - 4.9 g/dL 4.4     Total Protein 6.0 - 8.2 g/dL 7.2     Globulin 1.9 - 3.5 g/dL 2.8     A-G Ratio g/dL 1.6     Glycohemoglobin 4.0 - 5.6 % 5.3     Estim. Avg Glu mg/dL 105     Hsv I-Ii Igg Antibodies IV >22.40     HSV1 Gly IgG <=0.89 IV 44.50 (H)     HSV2 Gly IgG <=0.89 IV 0.89     Candida species DNA Probe Negative   Negative    Gardnerella vaginalis DNA Probe Negative   Negative    Trichamonas vaginalis DNA Probe Negative   Negative    US-PELVIC COMPLETE (TRANSABDOMINAL/TRANSVAGINAL) (COMBO)    Rpt   (L): Data is abnormally low  (H): Data is abnormally high  Rpt: View report in  Results Review for more information    Assessment & Plan:     49 y.o. female with the following -     Assessment & Plan  1. Health maintenance.  Her laboratory results from April 2024 were within normal limits. She is due for colon cancer screening in 2026, cervical cancer screening in 2028, and breast cancer screening in January 2025. Her vitamin D and thyroid levels were last checked in 2023, as was her cholesterol. She is advised to continue her regimen of vitamin D and magnesium supplements. She is encouraged to receive the COVID-19 and shingles vaccines. She is advised to schedule her mammogram after 01/31/2025, as insurance covers it once every 366 days. A mammogram order has been placed. A reminder will be set to order labs a month prior to her next visit.    2. Bunions.  A referral to podiatry has been initiated for further evaluation and management of her bunions.    3. Dizziness and memory changes.  A head CT has been ordered to evaluate any potential changes in her brain structure that may be contributing to her symptoms of dizziness and memory changes.    4. Spinal stenosis.  She uses magnesium cream topically to alleviate muscle soreness associated with spinal stenosis.    Follow-up  The patient will follow up in 1 year or sooner if needed.    PROCEDURE  The patient has had dental extractions and a tubal ligation in the past.    Problem List Items Addressed This Visit       HSV-2 (herpes simplex virus 2) infection     Chronic, stable. Using vavlacyclovir as needed upon prodrome.          Dizziness    Relevant Orders    CT-HEAD WITH & W/O    Bunion     Chronic, bilateral bunions. Requesting referral for podiatry for evaluation and treatment to prevent worsening deformity, pain         Relevant Orders    Referral to Podiatry    History of head injury     Reports history of 4 head injuries (3 as a child, 1 in 30's). Noticing difficulty with word recall, dizziness intermittently         Relevant Orders     CT-HEAD WITH & W/O     Other Visit Diagnoses       Memory changes        Relevant Orders    CT-HEAD WITH & W/O    Encounter for screening mammogram for malignant neoplasm of breast        Relevant Orders    MA-SCREENING MAMMO BILAT W/TOMOSYNTHESIS W/CAD          Patient was educated in proper administration of medication(s) ordered today including safety, possible SE, risks, benefits, rationale and alternatives to therapy.   Supportive care, differential diagnoses, and indications for immediate follow-up discussed with patient.    Pathogenesis of diagnosis discussed including typical length and natural progression.    Instructed to return to clinic or nearest emergency department for any change in condition, further concerns, or worsening of symptoms.  Patient states understanding of the plan of care and discharge instructions.    Return in about 1 year (around 12/18/2025), or if symptoms worsen or fail to improve, for Labs, wellness.    Please note that this dictation was created using voice recognition software. I have made every reasonable attempt to correct obvious errors, but I expect that there are errors of grammar and possibly content that I did not discover before finalizing the note.

## 2024-12-18 NOTE — ASSESSMENT & PLAN NOTE
Chronic, bilateral bunions. Requesting referral for podiatry for evaluation and treatment to prevent worsening deformity, pain

## 2024-12-18 NOTE — ASSESSMENT & PLAN NOTE
Reports history of 4 head injuries (3 as a child, 1 in 30's). Noticing difficulty with word recall, dizziness intermittently

## 2025-01-02 ENCOUNTER — HOSPITAL ENCOUNTER (OUTPATIENT)
Dept: RADIOLOGY | Facility: MEDICAL CENTER | Age: 50
End: 2025-01-02
Payer: COMMERCIAL

## 2025-01-02 DIAGNOSIS — Z87.828 HISTORY OF HEAD INJURY: ICD-10-CM

## 2025-01-02 DIAGNOSIS — R42 DIZZINESS: ICD-10-CM

## 2025-01-02 DIAGNOSIS — R41.3 MEMORY CHANGES: ICD-10-CM

## 2025-01-02 PROCEDURE — 70470 CT HEAD/BRAIN W/O & W/DYE: CPT

## 2025-01-02 PROCEDURE — 700117 HCHG RX CONTRAST REV CODE 255

## 2025-01-02 RX ADMIN — IOHEXOL 50 ML: 350 INJECTION, SOLUTION INTRAVENOUS at 11:10

## 2025-02-03 ENCOUNTER — APPOINTMENT (OUTPATIENT)
Dept: RADIOLOGY | Facility: MEDICAL CENTER | Age: 50
End: 2025-02-03
Payer: COMMERCIAL

## 2025-02-10 ENCOUNTER — APPOINTMENT (OUTPATIENT)
Dept: RADIOLOGY | Facility: MEDICAL CENTER | Age: 50
End: 2025-02-10
Payer: COMMERCIAL

## 2025-02-18 ENCOUNTER — APPOINTMENT (OUTPATIENT)
Dept: RADIOLOGY | Facility: MEDICAL CENTER | Age: 50
End: 2025-02-18
Payer: COMMERCIAL

## 2025-10-28 ENCOUNTER — APPOINTMENT (OUTPATIENT)
Dept: RADIOLOGY | Facility: MEDICAL CENTER | Age: 50
End: 2025-10-28
Payer: COMMERCIAL

## 2025-10-28 DIAGNOSIS — Z12.31 VISIT FOR SCREENING MAMMOGRAM: ICD-10-CM
